# Patient Record
Sex: MALE | Race: BLACK OR AFRICAN AMERICAN | NOT HISPANIC OR LATINO | Employment: FULL TIME | URBAN - METROPOLITAN AREA
[De-identification: names, ages, dates, MRNs, and addresses within clinical notes are randomized per-mention and may not be internally consistent; named-entity substitution may affect disease eponyms.]

---

## 2020-10-05 ENCOUNTER — OFFICE VISIT (OUTPATIENT)
Dept: INTERNAL MEDICINE | Facility: CLINIC | Age: 40
End: 2020-10-05
Payer: COMMERCIAL

## 2020-10-05 VITALS
DIASTOLIC BLOOD PRESSURE: 88 MMHG | SYSTOLIC BLOOD PRESSURE: 146 MMHG | HEART RATE: 90 BPM | OXYGEN SATURATION: 97 % | WEIGHT: 315 LBS | BODY MASS INDEX: 40.43 KG/M2 | TEMPERATURE: 98 F | HEIGHT: 74 IN

## 2020-10-05 DIAGNOSIS — G47.33 OBSTRUCTIVE SLEEP APNEA SYNDROME: ICD-10-CM

## 2020-10-05 DIAGNOSIS — F17.210 CIGARETTE NICOTINE DEPENDENCE, UNCOMPLICATED: ICD-10-CM

## 2020-10-05 DIAGNOSIS — M25.561 BILATERAL CHRONIC KNEE PAIN: ICD-10-CM

## 2020-10-05 DIAGNOSIS — L83 ACANTHOSIS NIGRICANS: ICD-10-CM

## 2020-10-05 DIAGNOSIS — Z23 NEED FOR 23-POLYVALENT PNEUMOCOCCAL POLYSACCHARIDE VACCINE: ICD-10-CM

## 2020-10-05 DIAGNOSIS — Z23 NEED FOR INFLUENZA VACCINATION: ICD-10-CM

## 2020-10-05 DIAGNOSIS — Z11.4 SCREENING FOR HIV WITHOUT PRESENCE OF RISK FACTORS: ICD-10-CM

## 2020-10-05 DIAGNOSIS — G89.29 BILATERAL CHRONIC KNEE PAIN: ICD-10-CM

## 2020-10-05 DIAGNOSIS — E66.01 MORBID OBESITY WITH BODY MASS INDEX (BMI) GREATER THAN OR EQUAL TO 50: ICD-10-CM

## 2020-10-05 DIAGNOSIS — I10 ESSENTIAL HYPERTENSION: ICD-10-CM

## 2020-10-05 DIAGNOSIS — M25.562 BILATERAL CHRONIC KNEE PAIN: ICD-10-CM

## 2020-10-05 DIAGNOSIS — R53.83 FATIGUE, UNSPECIFIED TYPE: ICD-10-CM

## 2020-10-05 DIAGNOSIS — Z28.21 IMMUNIZATION NOT CARRIED OUT BECAUSE OF PATIENT REFUSAL: Chronic | ICD-10-CM

## 2020-10-05 DIAGNOSIS — I87.303 CHRONIC VENOUS HYPERTENSION INVOLVING BOTH SIDES: ICD-10-CM

## 2020-10-05 DIAGNOSIS — Z11.59 ENCOUNTER FOR HEPATITIS C SCREENING TEST FOR LOW RISK PATIENT: ICD-10-CM

## 2020-10-05 DIAGNOSIS — Z00.01 ENCOUNTER FOR WELL ADULT EXAM WITH ABNORMAL FINDINGS: Primary | ICD-10-CM

## 2020-10-05 DIAGNOSIS — G47.10 HYPERSOMNIA: ICD-10-CM

## 2020-10-05 DIAGNOSIS — Z86.39 PERSONAL HISTORY OF OTHER ENDOCRINE, NUTRITIONAL AND METABOLIC DISEASE: ICD-10-CM

## 2020-10-05 PROCEDURE — 99386 PREV VISIT NEW AGE 40-64: CPT | Mod: S$GLB,,, | Performed by: FAMILY MEDICINE

## 2020-10-05 PROCEDURE — 99386 PR PREVENTIVE VISIT,NEW,40-64: ICD-10-PCS | Mod: S$GLB,,, | Performed by: FAMILY MEDICINE

## 2020-10-05 PROCEDURE — 99999 PR PBB SHADOW E&M-NEW PATIENT-LVL IV: CPT | Mod: PBBFAC,,, | Performed by: FAMILY MEDICINE

## 2020-10-05 PROCEDURE — 99999 PR PBB SHADOW E&M-NEW PATIENT-LVL IV: ICD-10-PCS | Mod: PBBFAC,,, | Performed by: FAMILY MEDICINE

## 2020-10-05 PROCEDURE — 3008F BODY MASS INDEX DOCD: CPT | Mod: CPTII,S$GLB,, | Performed by: FAMILY MEDICINE

## 2020-10-05 PROCEDURE — 3008F PR BODY MASS INDEX (BMI) DOCUMENTED: ICD-10-PCS | Mod: CPTII,S$GLB,, | Performed by: FAMILY MEDICINE

## 2020-10-05 RX ORDER — CHLORTHALIDONE 25 MG/1
25 TABLET ORAL DAILY
Qty: 30 TABLET | Refills: 0 | Status: SHIPPED | OUTPATIENT
Start: 2020-10-05 | End: 2020-12-03

## 2020-10-05 NOTE — PATIENT INSTRUCTIONS
Someone from [facility] will be contacting you soon to help you get the home sleep test done. If you haven't heard from them within 2 weeks, please call the [facility] at [phone] and let them know that you were ordered to have a home sleep test done and that you haven't yet been contacted.       I've entered an order for you to be allowed to enroll in [facility]'s Hypertension Digital Medicine program. It allows you to use a good quality electronic blood pressure monitor to measure your blood pressure at home. Those numbers are then automatically sent by your smartphone to your hypertension care team for review. The program also includes excellent heart-health lifestyle coaching.    You'll receive regular feedback from your care team, including recommendations for any needed medication adjustments, exercise and healthy eating tips, heart-healthy coaching, and monthly progress reports.    Enrolling is easy and quick. Please take the time right now to stop by the [facility] in the first-floor lobby to get started. You can also call the [facility] Help Desk at [phone] for help getting set up and for any technical support.

## 2020-10-05 NOTE — ASSESSMENT & PLAN NOTE
STOP-BANG questionnaire to assess risk for obstructive sleep apnea (BECKA)  · Snoring: Do you snore loudly? ANSWER: YES  · Tired: Do you often feel tired, fatigued, or sleepy during daytime? ANSWER: YES [place] Sleepiness Scale Score = 8 (HIGHER NORMAL Daytime Sleepiness)  · Observed: Has anyone observed you stop breathing during your sleep? ANSWER: NO  · Pressure: Do you have or are you being treated for high blood pressure? ANSWER: YES  · BMI: BMI more than 35 kg/m2? ANSWER: YES (BMI = Body mass index is 51.12 kg/m².)   · Age: Age over 50 yr old? ANSWER: NO (Age = 40 y.o.)  · Neck circumference: Neck circumference greater than 40 cm? ANSWER: YES (Neck circumference = 49 cm, Mallampati class 4 oropharynx.  · Gender: Gender male? ANSWER: YES (Gender = male)    STOP-BANG Score = 6 (HIGH risk of BECKA)    ADDITIONAL RELEVANT HISTORY: He reports non-restorative sleep.  He reports involuntary weight gain.     INSTRUCTIONS PROVIDED: Do not drive or perform hazardous activities while drowsy.

## 2020-10-05 NOTE — ASSESSMENT & PLAN NOTE
He is currently at the pre-contemplation stage of smoking cessation (not thinking about quitting). Smoking cessation encouraged.

## 2020-10-06 ENCOUNTER — TELEPHONE (OUTPATIENT)
Dept: PULMONOLOGY | Facility: HOSPITAL | Age: 40
End: 2020-10-06

## 2020-10-07 ENCOUNTER — HOSPITAL ENCOUNTER (OUTPATIENT)
Dept: RADIOLOGY | Facility: HOSPITAL | Age: 40
Discharge: HOME | End: 2020-10-07
Attending: FAMILY MEDICINE
Payer: COMMERCIAL

## 2020-10-07 ENCOUNTER — HOSPITAL ENCOUNTER (OUTPATIENT)
Dept: CARDIOLOGY | Facility: HOSPITAL | Age: 40
Discharge: HOME | End: 2020-10-07
Attending: FAMILY MEDICINE
Payer: COMMERCIAL

## 2020-10-07 DIAGNOSIS — I10 ESSENTIAL HYPERTENSION: ICD-10-CM

## 2020-10-07 DIAGNOSIS — M25.561 BILATERAL CHRONIC KNEE PAIN: ICD-10-CM

## 2020-10-07 DIAGNOSIS — G89.29 BILATERAL CHRONIC KNEE PAIN: ICD-10-CM

## 2020-10-07 DIAGNOSIS — M25.562 BILATERAL CHRONIC KNEE PAIN: ICD-10-CM

## 2020-10-07 PROCEDURE — 73562 X-RAY EXAM OF KNEE 3: CPT | Mod: 26,50,, | Performed by: RADIOLOGY

## 2020-10-07 PROCEDURE — 73562 XR KNEE ORTHO BILAT: ICD-10-PCS | Mod: 26,50,, | Performed by: RADIOLOGY

## 2020-10-07 PROCEDURE — 73562 X-RAY EXAM OF KNEE 3: CPT | Mod: TC,50

## 2020-10-07 PROCEDURE — 93010 EKG 12-LEAD: ICD-10-PCS | Mod: ,,, | Performed by: INTERNAL MEDICINE

## 2020-10-07 PROCEDURE — 93010 ELECTROCARDIOGRAM REPORT: CPT | Mod: ,,, | Performed by: INTERNAL MEDICINE

## 2020-10-07 PROCEDURE — 93005 ELECTROCARDIOGRAM TRACING: CPT

## 2020-10-07 NOTE — PROGRESS NOTES
"Please contact him and relay my message:  Your x-rays showed "degenerative" or "wear-and-tear" type changes. The x-rays did NOT show any fracture or dislocation. We'll discuss all this further at your next appointment. I look forward to seeing you then."

## 2020-10-08 ENCOUNTER — TELEPHONE (OUTPATIENT)
Dept: INTERNAL MEDICINE | Facility: CLINIC | Age: 40
End: 2020-10-08

## 2020-10-12 ENCOUNTER — TELEPHONE (OUTPATIENT)
Dept: INTERNAL MEDICINE | Facility: CLINIC | Age: 40
End: 2020-10-12

## 2020-10-12 NOTE — TELEPHONE ENCOUNTER
Spoke with patient regarding lab results. Explained to patient that  would discuss with him at next appointment.Patient stated that he understand//LB

## 2020-10-16 NOTE — PROGRESS NOTES
WELLNESS VISIT (PREVENTIVE SERVICES)    CHIEF COMPLAINT  Annual Wellness Exam    HEALTH MAINTENANCE INTERVENTIONS - UP TO DATE  Health Maintenance Topics with due status: Not Due       Topic Last Completion Date    TETANUS VACCINE 01/01/2017    Lipid Panel 10/07/2020    Hemoglobin A1c 10/07/2020       HEALTH MAINTENANCE INTERVENTIONS - DUE OR DUE SOON  Health Maintenance Due   Topic Date Due    Foot Exam  08/19/1989    Eye Exam  08/19/1989    Urine Microalbumin  08/19/1989    Low Dose Statin  08/19/2000     This is my first time treating him here. All problems addressed today are NEW TO ME.  Josesito is requesting that I take over as his primary care provider.   He represents that his chronic conditions are stable, although perhaps not well controlled, and he is due/overdie for labs to evaluate and monitor his chronic health conditions. No other chronic problems or new complaints or concerns reported except as noted herein.    DIAGNOSES, HISTORY, ASSESSMENT, AND PLAN  Assessment   1. Encounter for well adult exam with abnormal findings    2. Screening for HIV without presence of risk factors    3. Encounter for hepatitis C screening test for low risk patient    4. Need for 23-polyvalent pneumococcal polysaccharide vaccine    5. Need for influenza vaccination    6. Immunizations not carried out because of patient refusal    7. Morbid obesity with body mass index (BMI) greater than or equal to 50    8. Essential hypertension    9. Cigarette nicotine dependence, uncomplicated    10. Bilateral chronic knee pain    11. Acanthosis nigricans    12. Obstructive sleep apnea syndrome    13. Fatigue, unspecified type    14. Hypersomnia    15. Chronic venous hypertension involving both sides    16. Personal history of other endocrine, nutritional and metabolic disease          Orders Placed This Encounter    CBC Without Differential    Lipid Panel    Hepatitis C Antibody    HIV 1/2 Ag/Ab (4th Gen)    Comprehensive  Metabolic Panel    Hemoglobin A1C    TSH    Ambulatory referral/consult to Smoking Cessation Program    NURSING COMMUNICATION: Create [facility]    Hypertension Digital Medicine (HDMP) Enrollment Order    SCHEDULED EKG 12-LEAD (to [place])    Home Sleep Studies    chlorthalidone (HYGROTEN) 25 MG Tab    Hypertension Digital Medicine (HDMP): Assign Onboarding Questionnaires       Plan   Problem List Items Addressed This Visit     Immunizations not carried out because of patient refusal (Chronic)    Overview     He refuses all immunizations.         Morbid obesity with body mass index (BMI) greater than or equal to 50    Relevant Orders    CBC Without Differential (Completed)    Lipid Panel (Completed)    Comprehensive Metabolic Panel (Completed)    Hemoglobin A1C (Completed)    TSH (Completed)    Essential hypertension    Relevant Medications    chlorthalidone (HYGROTEN) 25 MG Tab    Other Relevant Orders    Lipid Panel (Completed)    Comprehensive Metabolic Panel (Completed)    TSH (Completed)    SCHEDULED EKG 12-LEAD (to [place]) (Completed)    NURSING COMMUNICATION: Create [facility]    Hypertension Digital Medicine (HDMP) Enrollment Order (Completed)    Hypertension Digital Medicine (HDMP): Assign Onboarding Questionnaires (Completed)    Cigarette nicotine dependence, uncomplicated    Current Assessment & Plan     He is currently at the pre-contemplation stage of smoking cessation (not thinking about quitting). Smoking cessation encouraged.          Relevant Orders    Ambulatory referral/consult to Smoking Cessation Program    Bilateral chronic knee pain    Acanthosis nigricans    Relevant Orders    Comprehensive Metabolic Panel (Completed)    Hemoglobin A1C (Completed)    TSH (Completed)    Obstructive sleep apnea syndrome    Current Assessment & Plan     STOP-BANG questionnaire to assess risk for obstructive sleep apnea (BECKA)  · Snoring: Do you snore loudly? ANSWER: YES  · Tired: Do you often  feel tired, fatigued, or sleepy during daytime? ANSWER: YES [place] Sleepiness Scale Score = 8 (HIGHER NORMAL Daytime Sleepiness)  · Observed: Has anyone observed you stop breathing during your sleep? ANSWER: NO  · Pressure: Do you have or are you being treated for high blood pressure? ANSWER: YES  · BMI: BMI more than 35 kg/m2? ANSWER: YES (BMI = Body mass index is 51.12 kg/m².)   · Age: Age over 50 yr old? ANSWER: NO (Age = 40 y.o.)  · Neck circumference: Neck circumference greater than 40 cm? ANSWER: YES (Neck circumference = 49 cm, Mallampati class 4 oropharynx.  · Gender: Gender male? ANSWER: YES (Gender = male)    STOP-BANG Score = 6 (HIGH risk of BECKA)    ADDITIONAL RELEVANT HISTORY: He reports non-restorative sleep.  He reports involuntary weight gain.     INSTRUCTIONS PROVIDED: Do not drive or perform hazardous activities while drowsy.          Relevant Orders    Home Sleep Studies    Chronic venous hypertension involving both sides    Relevant Orders    Comprehensive Metabolic Panel (Completed)      Other Visit Diagnoses     Encounter for well adult exam with abnormal findings    -  Primary    Screening for HIV without presence of risk factors        Relevant Orders    HIV 1/2 Ag/Ab (4th Gen) (Completed)    Encounter for hepatitis C screening test for low risk patient        Relevant Orders    Hepatitis C Antibody (Completed)    Need for 23-polyvalent pneumococcal polysaccharide vaccine        Need for influenza vaccination        Fatigue, unspecified type        Relevant Orders    CBC Without Differential (Completed)    Comprehensive Metabolic Panel (Completed)    TSH (Completed)    Home Sleep Studies    Hypersomnia        Relevant Orders    Home Sleep Studies    Personal history of other endocrine, nutritional and metabolic disease         Relevant Orders    Hemoglobin A1C (Completed)          No outpatient medications prior to visit.     No facility-administered medications prior to visit.         There  "are no discontinued medications.     Medications Ordered This Encounter   Medications    chlorthalidone (HYGROTEN) 25 MG Tab     Sig: Take 1 tablet (25 mg total) by mouth once daily.     Dispense:  30 tablet     Refill:  0     - LABS AND APPOINTMENT REQUIRED FOR MORE REFILLS       Follow up in about 2 weeks (around 10/19/2020).     Subjective   Review of Systems   Constitutional: Negative for chills and fever.   HENT: Negative for ear pain and trouble swallowing.    Eyes: Negative for pain and visual disturbance.   Respiratory: Negative for chest tightness and shortness of breath.    Cardiovascular: Negative for chest pain and palpitations.   Gastrointestinal: Negative for abdominal pain and blood in stool.   Endocrine: Negative for polydipsia and polyuria.   Genitourinary: Negative.  Negative for difficulty urinating, dysuria and hematuria.   Musculoskeletal: Negative for gait problem and joint swelling.   Integumentary:  Negative for rash and wound.   Neurological: Negative for vertigo and syncope.   Hematological: Negative.    Psychiatric/Behavioral: Negative for agitation and confusion.        Objective   Vitals:    10/05/20 1452   BP: (!) 146/88   BP Location: Left arm   Patient Position: Sitting   BP Method: Large (Manual)   Pulse: 90   Temp: 97.8 °F (36.6 °C)   TempSrc: Tympanic   SpO2: 97%   Weight: (!) 180.6 kg (398 lb 2.4 oz)   Height: 6' 2" (1.88 m)     Physical Exam  Vitals signs reviewed.   Constitutional:       General: He is not in acute distress.     Appearance: Normal appearance.   Eyes:      General: No scleral icterus.     Conjunctiva/sclera: Conjunctivae normal.   Neck:      Musculoskeletal: No muscular tenderness.      Vascular: No carotid bruit.   Cardiovascular:      Rate and Rhythm: Normal rate and regular rhythm.      Heart sounds: Normal heart sounds.   Pulmonary:      Effort: Pulmonary effort is normal. No respiratory distress.      Breath sounds: Normal breath sounds. No wheezing or " "rhonchi.   Abdominal:      General: Bowel sounds are normal. There is no distension.      Palpations: Abdomen is soft. There is no mass.      Tenderness: There is no abdominal tenderness.   Lymphadenopathy:      Cervical: No cervical adenopathy.   Skin:     General: Skin is warm and dry.      Coloration: Skin is not jaundiced.   Neurological:      General: No focal deficit present.      Mental Status: He is alert and oriented to person, place, and time.   Psychiatric:         Mood and Affect: Mood normal.         Behavior: Behavior normal.         Judgment: Judgment normal.              PAST MEDICAL HISTORY  He has a past medical history of Essential hypertension.    SURGICAL HISTORY  He has a past surgical history that includes Knee surgery (Left, 1996/1997).    FAMILY HISTORY  His family history includes No Known Problems in his father and mother.     ALLERGIES  Review of patient's allergies indicates:  No Known Allergies    SOCIAL HISTORY   TOBACCO USE HISTORY: He reports that he has been smoking cigarettes. He has been smoking about 0.50 packs per day. He has never used smokeless tobacco.   ALCOHOL USE HISTORY:  He reports current alcohol use.   RECREATIONAL DRUG USE HISTORY:  He reports no history of drug use.    Documentation entered by me for this encounter may have been done in part using speech-recognition technology. Although I have made an effort to ensure accuracy, "sound like" errors may exist and should be interpreted in context. -ARUN Monson MD.     "

## 2020-10-19 ENCOUNTER — OFFICE VISIT (OUTPATIENT)
Dept: INTERNAL MEDICINE | Facility: CLINIC | Age: 40
End: 2020-10-19
Payer: COMMERCIAL

## 2020-10-19 ENCOUNTER — PATIENT MESSAGE (OUTPATIENT)
Dept: ADMINISTRATIVE | Facility: OTHER | Age: 40
End: 2020-10-19

## 2020-10-19 ENCOUNTER — LAB VISIT (OUTPATIENT)
Dept: LAB | Facility: HOSPITAL | Age: 40
End: 2020-10-19
Attending: FAMILY MEDICINE
Payer: COMMERCIAL

## 2020-10-19 VITALS
OXYGEN SATURATION: 95 % | DIASTOLIC BLOOD PRESSURE: 60 MMHG | WEIGHT: 315 LBS | HEIGHT: 74 IN | HEART RATE: 84 BPM | TEMPERATURE: 99 F | SYSTOLIC BLOOD PRESSURE: 120 MMHG | BODY MASS INDEX: 40.43 KG/M2

## 2020-10-19 DIAGNOSIS — E11.69 TYPE 2 DIABETES MELLITUS WITH OTHER SPECIFIED COMPLICATION, WITHOUT LONG-TERM CURRENT USE OF INSULIN: Primary | Chronic | ICD-10-CM

## 2020-10-19 DIAGNOSIS — E11.69 TYPE 2 DIABETES MELLITUS WITH OTHER SPECIFIED COMPLICATION, WITHOUT LONG-TERM CURRENT USE OF INSULIN: Chronic | ICD-10-CM

## 2020-10-19 DIAGNOSIS — G47.33 OBSTRUCTIVE SLEEP APNEA SYNDROME: ICD-10-CM

## 2020-10-19 DIAGNOSIS — I10 ESSENTIAL HYPERTENSION: ICD-10-CM

## 2020-10-19 DIAGNOSIS — E78.5 DYSLIPIDEMIA ASSOCIATED WITH TYPE 2 DIABETES MELLITUS: ICD-10-CM

## 2020-10-19 DIAGNOSIS — E11.69 DYSLIPIDEMIA ASSOCIATED WITH TYPE 2 DIABETES MELLITUS: ICD-10-CM

## 2020-10-19 DIAGNOSIS — E66.01 MORBID OBESITY WITH BODY MASS INDEX (BMI) GREATER THAN OR EQUAL TO 50: ICD-10-CM

## 2020-10-19 PROCEDURE — 3008F BODY MASS INDEX DOCD: CPT | Mod: CPTII,S$GLB,, | Performed by: FAMILY MEDICINE

## 2020-10-19 PROCEDURE — 99214 PR OFFICE/OUTPT VISIT, EST, LEVL IV, 30-39 MIN: ICD-10-PCS | Mod: S$GLB,,, | Performed by: FAMILY MEDICINE

## 2020-10-19 PROCEDURE — 82043 UR ALBUMIN QUANTITATIVE: CPT

## 2020-10-19 PROCEDURE — 99999 PR PBB SHADOW E&M-EST. PATIENT-LVL V: CPT | Mod: PBBFAC,,, | Performed by: FAMILY MEDICINE

## 2020-10-19 PROCEDURE — 3052F PR MOST RECENT HEMOGLOBIN A1C LEVEL 8.0 - < 9.0%: ICD-10-PCS | Mod: CPTII,S$GLB,, | Performed by: FAMILY MEDICINE

## 2020-10-19 PROCEDURE — 3008F PR BODY MASS INDEX (BMI) DOCUMENTED: ICD-10-PCS | Mod: CPTII,S$GLB,, | Performed by: FAMILY MEDICINE

## 2020-10-19 PROCEDURE — 3052F HG A1C>EQUAL 8.0%<EQUAL 9.0%: CPT | Mod: CPTII,S$GLB,, | Performed by: FAMILY MEDICINE

## 2020-10-19 PROCEDURE — 99999 PR PBB SHADOW E&M-EST. PATIENT-LVL V: ICD-10-PCS | Mod: PBBFAC,,, | Performed by: FAMILY MEDICINE

## 2020-10-19 PROCEDURE — 99214 OFFICE O/P EST MOD 30 MIN: CPT | Mod: S$GLB,,, | Performed by: FAMILY MEDICINE

## 2020-10-19 RX ORDER — ATORVASTATIN CALCIUM 20 MG/1
20 TABLET, FILM COATED ORAL DAILY
Qty: 90 TABLET | Refills: 3 | Status: SHIPPED | OUTPATIENT
Start: 2020-10-19 | End: 2020-10-19 | Stop reason: SDUPTHER

## 2020-10-19 RX ORDER — METFORMIN HYDROCHLORIDE 500 MG/1
TABLET, EXTENDED RELEASE ORAL
Qty: 180 TABLET | Refills: 0 | Status: SHIPPED | OUTPATIENT
Start: 2020-10-19 | End: 2021-06-08 | Stop reason: SDUPTHER

## 2020-10-19 RX ORDER — ATORVASTATIN CALCIUM 20 MG/1
20 TABLET, FILM COATED ORAL NIGHTLY
Qty: 90 TABLET | Refills: 4 | Status: SHIPPED | OUTPATIENT
Start: 2020-10-19 | End: 2021-06-08 | Stop reason: SDUPTHER

## 2020-10-19 NOTE — PATIENT INSTRUCTIONS
Learn more about healthy lifestyle choices at the website of the American Diabetes Association, www.diabetes.org.       Call the [facility] at [phone] and let them know that you were ordered to have a home sleep test done and that you haven't yet been contacted.      I've entered a referral order for you to be able to participate in [facility]'s excellent Comprehensive Weight Loss Program. Call [phone] to schedule your appointment. You can learn more about the program at [email or web address]      I've entered an order for you to be enrolled in [facility]'s Hypertension and Diabetes Digital Medicine programs. The programs allow you to use good-quality electronic meters to measure your blood pressure and blood glucose at home. Those numbers are then automatically sent by your smartphone to your hypertension and diabetes care teams for review.    You'll receive regular feedback from your care teams, including recommendations for any needed medication adjustments, exercise and healthy eating tips, heart-healthy coaching, and monthly progress reports.    Enrolling is easy and quick. Please take the time right now to stop by the [facility] in the first-floor lobby to get started. You can also call the [facility] Help Desk at [phone] for help getting set up and for any technical support.

## 2020-10-19 NOTE — PROGRESS NOTES
CHIEF COMPLAINT  Follow-up and Results      DIAGNOSES, HISTORY, ASSESSMENT, AND PLAN  Assessment   1. Type 2 diabetes mellitus with other specified complication, without long-term current use of insulin    2. Essential hypertension    3. Dyslipidemia associated with type 2 diabetes mellitus    4. Obstructive sleep apnea syndrome    5. Morbid obesity with body mass index (BMI) greater than or equal to 50       New diagnosis: Type 2 diabetes.    Orders Placed This Encounter    Microalbumin/Creatinine Ratio, Urine    Ambulatory referral/consult to Diabetes Education    Ambulatory referral/consult to Optometry    Ambulatory referral/consult to Weight Management Program    Ambulatory referral/consult to Bariatric Surgery    Hypertension Digital Medicine (HDMP) Enrollment Order    Diabetes Digital Medicine (DDMP) Enrollment Order    metFORMIN (GLUCOPHAGE-XR) 500 MG ER 24hr tablet    atorvastatin (LIPITOR) 20 MG tablet    Diabetes Digital Medicine (DDMP): Assign Onboarding Questionnaires       Except as noted herein, any chronic conditions are compensated/controlled and stable, and no other significant new complaints or concerns reported.     Plan   Problem List Items Addressed This Visit     Type 2 diabetes mellitus with other specified complication - Primary (Chronic)    Current Assessment & Plan     Diabetes Management Status    Statin: Taking  ACE/ARB: Not taking    Screening or Prevention Patient's value Goal Complete/Controlled?   HgA1C Testing and Control   Lab Results   Component Value Date    HGBA1C 9.0 (H) 10/07/2020      Annually/Less than 8% No   Lipid profile : 10/07/2020 Annually Yes   LDL control Lab Results   Component Value Date    LDLCALC 104.0 10/07/2020    Annually/Less than 100 mg/dl  No   Nephropathy screening No results found for: LABMICR  No results found for: PROTEINUA Annually No   Blood pressure BP Readings from Last 1 Encounters:   10/19/20 120/60    Less than 140/90 Yes   Dilated  retinal exam Most Recent Eye Exam Date: Not Found Annually No   Foot exam   : 10/19/2020 Annually Yes     Lab Results   Component Value Date    HGBA1C 9.0 (H) 10/07/2020    ESTGFRAFRICA >60.0 10/07/2020    EGFRNONAA >60.0 10/07/2020    LDLCALC 104.0 10/07/2020     Last 6 Patient Entered Readings                                          Most Recent A1c:      There is no flowsheet data to display.         New diagnosis. Asymptomatic, uncontrolled. We discussed risks and benefits of treatment options. Therapeutic lifestyle changes encouraged.    HEALTH MAINTENANCE: Diabetic health maintenance interventions reviewed and are up to date except for:      Topic    Eye Exam     Urine Protein Check      DIABETIC FOOT EXAM: Inspection of feet reveals no open wounds, ulcerations, significant calluses, or evidence of arterial insufficiency. 10g monofilament sensation is intact in all 5 locations tested.          Relevant Medications    metFORMIN (GLUCOPHAGE-XR) 500 MG ER 24hr tablet    Other Relevant Orders    Microalbumin/Creatinine Ratio, Urine    Ambulatory referral/consult to Diabetes Education    Ambulatory referral/consult to Optometry    Diabetes Digital Medicine (DDMP) Enrollment Order (Completed)    Diabetes Digital Medicine (DDMP): Assign Onboarding Questionnaires (Completed)    Ambulatory referral/consult to Bariatric Surgery    Morbid obesity with body mass index (BMI) greater than or equal to 50    Relevant Orders    Ambulatory referral/consult to Weight Management Program    Ambulatory referral/consult to Bariatric Surgery    Essential hypertension    Relevant Orders    Hypertension Digital Medicine (HDMP) Enrollment Order (Completed)    Obstructive sleep apnea syndrome    Relevant Orders    Ambulatory referral/consult to Bariatric Surgery    Dyslipidemia associated with type 2 diabetes mellitus    Current Assessment & Plan     Lab Results   Component Value Date    CHOL 165 10/07/2020    TRIG 85 10/07/2020    HDL  44 10/07/2020    LDLCALC 104.0 10/07/2020    NONHDLCHOL 121 10/07/2020    AST 31 10/07/2020    ALT 67 (H) 10/07/2020     The 10-year ASCVD risk score (Gt NGUYEN Jr., et al., 2013) is: 14.9%    Values used to calculate the score:      Age: 40 years      Sex: Male      Is Non- : Yes      Diabetic: Yes      Tobacco smoker: Yes      Systolic Blood Pressure: 120 mmHg      Is BP treated: Yes      HDL Cholesterol: 44 mg/dL      Total Cholesterol: 165 mg/dL  We discussed risks and benefits of treatment options. No contraindication to statin.          Relevant Medications    metFORMIN (GLUCOPHAGE-XR) 500 MG ER 24hr tablet    atorvastatin (LIPITOR) 20 MG tablet          Outpatient Medications Prior to Visit   Medication Sig Dispense Refill    chlorthalidone (HYGROTEN) 25 MG Tab Take 1 tablet (25 mg total) by mouth once daily. 30 tablet 0     No facility-administered medications prior to visit.         Medications Discontinued During This Encounter   Medication Reason    atorvastatin (LIPITOR) 20 MG tablet Reorder        Medications Ordered This Encounter   Medications    atorvastatin (LIPITOR) 20 MG tablet     Sig: Take 1 tablet (20 mg total) by mouth every evening. (FOR HEART HEALTH)     Dispense:  90 tablet     Refill:  4    metFORMIN (GLUCOPHAGE-XR) 500 MG ER 24hr tablet     Sig: Take 1 tablet (500 mg total) by mouth daily with breakfast for 7 days, THEN 1 tablet (500 mg total) 2 (two) times daily with meals.     Dispense:  180 tablet     Refill:  0       Subjective   Review of Systems   Constitutional: Negative for chills and fever.   Respiratory: Negative for chest tightness and shortness of breath.    Endocrine: Negative for polydipsia and polyuria.        Objective   Vitals:    10/19/20 1123   BP: 120/60   BP Location: Right arm   Patient Position: Sitting   BP Method: Large (Manual)   Pulse: 84   Temp: 98.8 °F (37.1 °C)   TempSrc: Tympanic   SpO2: 95%   Weight: (!) 177.8 kg (391 lb 15.7 oz)  "  Height: 6' 2" (1.88 m)     Physical Exam  Vitals signs reviewed.   Constitutional:       General: He is not in acute distress.     Appearance: Normal appearance. He is not ill-appearing or diaphoretic.   Eyes:      General: No scleral icterus.  Cardiovascular:      Rate and Rhythm: Normal rate and regular rhythm.   Pulmonary:      Effort: Pulmonary effort is normal.      Breath sounds: Normal breath sounds.   Skin:     General: Skin is warm and dry.   Neurological:      General: No focal deficit present.      Mental Status: He is alert and oriented to person, place, and time.   Psychiatric:         Mood and Affect: Mood normal.         Behavior: Behavior normal.         Judgment: Judgment normal.           Documentation entered by me for this encounter may have been done in part using speech-recognition technology. Although I have made an effort to ensure accuracy, "sound like" errors may exist and should be interpreted in context. -ARUN Monson MD.    "

## 2020-10-19 NOTE — ASSESSMENT & PLAN NOTE
Diabetes Management Status    Statin: Taking  ACE/ARB: Not taking    Screening or Prevention Patient's value Goal Complete/Controlled?   HgA1C Testing and Control   Lab Results   Component Value Date    HGBA1C 9.0 (H) 10/07/2020      Annually/Less than 8% No   Lipid profile : 10/07/2020 Annually Yes   LDL control Lab Results   Component Value Date    LDLCALC 104.0 10/07/2020    Annually/Less than 100 mg/dl  No   Nephropathy screening No results found for: LABMICR  No results found for: PROTEINUA Annually No   Blood pressure BP Readings from Last 1 Encounters:   10/19/20 120/60    Less than 140/90 Yes   Dilated retinal exam Most Recent Eye Exam Date: Not Found Annually No   Foot exam   : 10/19/2020 Annually Yes     Lab Results   Component Value Date    HGBA1C 9.0 (H) 10/07/2020    ESTGFRAFRICA >60.0 10/07/2020    EGFRNONAA >60.0 10/07/2020    LDLCALC 104.0 10/07/2020     Last 6 Patient Entered Readings                                          Most Recent A1c:      There is no flowsheet data to display.         New diagnosis. Asymptomatic, uncontrolled. We discussed risks and benefits of treatment options. Therapeutic lifestyle changes encouraged.    HEALTH MAINTENANCE: Diabetic health maintenance interventions reviewed and are up to date except for:      Topic    Eye Exam     Urine Protein Check      DIABETIC FOOT EXAM: Inspection of feet reveals no open wounds, ulcerations, significant calluses, or evidence of arterial insufficiency. 10g monofilament sensation is intact in all 5 locations tested.   
Lab Results   Component Value Date    CHOL 165 10/07/2020    TRIG 85 10/07/2020    HDL 44 10/07/2020    LDLCALC 104.0 10/07/2020    NONHDLCHOL 121 10/07/2020    AST 31 10/07/2020    ALT 67 (H) 10/07/2020     The 10-year ASCVD risk score (Gt NGUYEN Jr., et al., 2013) is: 14.9%    Values used to calculate the score:      Age: 40 years      Sex: Male      Is Non- : Yes      Diabetic: Yes      Tobacco smoker: Yes      Systolic Blood Pressure: 120 mmHg      Is BP treated: Yes      HDL Cholesterol: 44 mg/dL      Total Cholesterol: 165 mg/dL  We discussed risks and benefits of treatment options. No contraindication to statin.   
No

## 2020-10-20 ENCOUNTER — PATIENT OUTREACH (OUTPATIENT)
Dept: OTHER | Facility: OTHER | Age: 40
End: 2020-10-20

## 2020-10-20 DIAGNOSIS — E11.9 TYPE 2 DIABETES MELLITUS: ICD-10-CM

## 2020-10-20 LAB
ALBUMIN/CREAT UR: 11.2 UG/MG (ref 0–30)
CREAT UR-MCNC: 125 MG/DL (ref 23–375)
MICROALBUMIN UR DL<=1MG/L-MCNC: 14 UG/ML

## 2020-10-20 NOTE — PROGRESS NOTES
"Josesito Hamilton.    I'm happy to report that these results are fine and do not require a change in treatment.    Thanks for letting me care for you, thanks for trusting us with your healthcare needs, and thanks for using [facility].    Sincerely,    ARUN Monson MD  --------------------------------------------------------------------------------   Want to learn more about your test results and what they mean?  (1) Log in to your [facility] account at [email or web address].  (2) From the "View test results" tab, click on the test you want to know more about.  (3) Click on the "About This Test" link."

## 2020-10-20 NOTE — LETTER
November 18, 2020     Josesito Crabtree  [address] [facility] [address]       Dear Josesito,    Welcome to [facility] Our goal is to make care effective, proactive and convenient by using data you send us from home to better treat your chronic conditions.                My name is Eleanor Sandoval, and I am your dedicated Digital Medicine clinician. As an expert in medication management, I will help ensure that the medications you are taking continue to provide the intended benefits and help you reach your goals. You can reach me directly at [phone] or by sending me a message directly through your [facility] account.      I am Amelia York and I will be your health . My job is to help you identify lifestyle changes to improve your disease control. We will talk about nutrition, exercise, and other ways you may be able to adjust your current habits to better your health. Additionally, we will help ensure you are completing the tests and screenings that are necessary to help manage your conditions. You can reach me directly at [phone] or by sending me a message directly through your [facility] account.    Most importantly, YOU are at the center of this team. Together, we will work to improve your overall health and encourage you to meet your goals for a healthier lifestyle.     What we expect from YOU:  · Please take frequent home blood pressure measurements. We ask that you take at least 1 blood pressure reading per week, but more information will better help us get you know you. Be sure you rest for a few minutes before taking the reading in a quiet, comfortable place.    · Please take frequent home blood sugar measurements according to the frequency your physician and Digital Medicine care team specify. It is important that your team see both fasting and after meal readings.      Be available to receive phone calls or [employer] messages, when appropriate, from your care  team. Please let us know if there are any specific days or times that work best for us to reach you via phone.     Complete routine tests and screenings. Dont worry, we will help keep you on track!           What you should expect from your Digital Medicine Care Team:   We will work with you to create a personalized plan of care and provide you with encouragement and education, including regarding lifestyle changes, that could help you manage your disease states.     We will adjust your current medications, if needed, and continue to monitor your long-term progress.     We will provide you and your physician with monthly progress reports after you have been in the program for more than 30 days.     We will send you reminders through [employer] and text messages to help ensure you do not miss any testing deadlines to help manage your disease states.    You will be able to reach us by phone or through your [email or web address] account by clicking our names under Care Team on the right side of the home screen.    We look forward to working with you to help manage your health,    Sincerely,    Your Digital Medicine Team    Please visit our websites to learn more:   · Hypertension: [email or web address]  · Diabetes: [email or web address]      Remember, we are not available for emergencies. If you have an emergency, please contact your doctors office directly or call [facility] ([phone] or [phone]) or 911.    Diabetes: We want help you get important tests and screenings done regularly to assure that your health needs are met. We have put a new system in place, called CareTouch that will help us improve how we monitor and reach out to you about the following lab tests that you will need to help manage your diabetes.  · Hemoglobin A1c testing (Frequency: Every 3 to 6 months, dependent on A1c goal)  · Nephropathy Assessment, generally urine micro albumin testing (Frequency:  Yearly)  · Eye exam through a quick 30-minute Eye Photo Exam (Frequency: 1-2 Years, depending on result)    When necessary you can come in to one of the lab locations between 10:30 am and 4:00 pm to have your tests done prior to their due date. Tell the  you received a CareTouch letter, or just look for the CareTouch sign.    For CareTouch lab locations, click here.

## 2020-10-23 ENCOUNTER — PATIENT MESSAGE (OUTPATIENT)
Dept: SLEEP MEDICINE | Facility: HOSPITAL | Age: 40
End: 2020-10-23

## 2020-11-10 ENCOUNTER — PROCEDURE VISIT (OUTPATIENT)
Dept: SLEEP MEDICINE | Facility: CLINIC | Age: 40
End: 2020-11-10
Payer: COMMERCIAL

## 2020-11-10 DIAGNOSIS — G47.33 OBSTRUCTIVE SLEEP APNEA SYNDROME: Primary | ICD-10-CM

## 2020-11-10 DIAGNOSIS — R53.83 FATIGUE, UNSPECIFIED TYPE: ICD-10-CM

## 2020-11-10 DIAGNOSIS — G47.10 HYPERSOMNIA: ICD-10-CM

## 2020-11-10 PROCEDURE — 95800 SLP STDY UNATTENDED: CPT

## 2020-11-10 PROCEDURE — 95800 SLP STDY UNATTENDED: CPT | Mod: 26,,, | Performed by: INTERNAL MEDICINE

## 2020-11-10 PROCEDURE — 95800 PR SLEEP STUDY, UNATTENDED, RECORD HEART RATE/O2 SAT/RESP ANAL/SLEEP TIME: ICD-10-PCS | Mod: 26,,, | Performed by: INTERNAL MEDICINE

## 2020-11-10 NOTE — Clinical Note
PHYSICIAN INTERPRETATION AND COMMENTS: Findings are consistent with moderate, non-positional obstructive sleep  apnea (BECKA).Overall AHI was 21.0/hr, Loud snoring. Please refer to sleep disorder clinic for evaluation and management. CPAP  titration or AutoPAP 5-20 cmwp with mask of choice.  CLINICAL HISTORY: 40 year old male presented with: 18.5 inch neck, BMI of 49, an [place] sleepiness score of 4, history of  hypertension, diabetes, a previous diagnosis of BECKA, daily use of supplemental oxygen and symptoms of nocturnal snoring. Based on  the clinical history, the patient has a high pre-test probability of having severe BECKA. STOPBANG score 6.

## 2020-11-11 NOTE — PROCEDURES
Home Sleep Studies    Date/Time: 11/10/2020 8:00 AM  Performed by: Estrada Potts MD  Authorized by: ARUN Monson MD       PHYSICIAN INTERPRETATION AND COMMENTS: Findings are consistent with moderate, non-positional obstructive sleep  apnea (BECKA).Overall AHI was 21.0/hr, Loud snoring. Please refer to sleep disorder clinic for evaluation and management. CPAP  titration or AutoPAP 5-20 cmwp with mask of choice.  CLINICAL HISTORY: 40 year old male presented with: 18.5 inch neck, BMI of 49, an [place] sleepiness score of 4, history of  hypertension, diabetes, a previous diagnosis of BECKA, daily use of supplemental oxygen and symptoms of nocturnal snoring. Based on  the clinical history, the patient has a high pre-test probability of having severe BECKA. STOPBANG score 6.

## 2020-11-12 ENCOUNTER — PATIENT OUTREACH (OUTPATIENT)
Dept: ADMINISTRATIVE | Facility: HOSPITAL | Age: 40
End: 2020-11-12

## 2020-11-12 NOTE — PROGRESS NOTES
Working A1c Panel report:     Last A1c was 10/2020 and was > 8. Pt will be due again in 01/2021. Called to pt discuss scheduling 1 month f/u. No answer, sent [employer] message.

## 2020-11-18 ENCOUNTER — PATIENT MESSAGE (OUTPATIENT)
Dept: INTERNAL MEDICINE | Facility: CLINIC | Age: 40
End: 2020-11-18

## 2020-11-18 ENCOUNTER — PATIENT OUTREACH (OUTPATIENT)
Dept: OTHER | Facility: OTHER | Age: 40
End: 2020-11-18

## 2020-11-18 DIAGNOSIS — E11.69 TYPE 2 DIABETES MELLITUS WITH OTHER SPECIFIED COMPLICATION, WITHOUT LONG-TERM CURRENT USE OF INSULIN: Primary | Chronic | ICD-10-CM

## 2020-11-18 DIAGNOSIS — G47.33 OBSTRUCTIVE SLEEP APNEA SYNDROME: Primary | ICD-10-CM

## 2020-11-18 NOTE — PROGRESS NOTES
Digital Medicine: Health  Introduction    Introduced Josesito Crabtree to Digital Medicine. Discussed health  role and recommended lifestyle modifications.    The history is provided by the patient.               HYPERTENSION  Explained hypertension digital medicine goals including BP goal less than or equal to 130/80mmHg, improved convenience of BP management and reduced risk of heart attack, kidney failure, stroke, eye disease, dementia, and death.      Explained non-pharmacologic therapies like low salt diet and physical activity can reduce blood pressure. .      Explained that we expect patient to submit several blood pressure readings per week at random times of the day, but at least 30 minutes after taking blood pressure medications. Instructed patient not to allow anyone else to use their blood pressure monitor and phone as data submitted is directly entered into medical record. Reviewed and confirmed appropriate blood pressure monitoring technique.         Patient's BP goal is less than or equal to 130/80.Patient's BP average is 149/91 mmHg, which is above goal, per 2017 ACC/AHA Hypertension Guidelines.        DIABETES  Explained the goal of the diabetes digital medicine program is to decrease A1c within patient-specific target levels. Reviewed benefits of A1c reduction including reducing risk for kidney, eye, and nerve disease.      Explained that we expect patient to submit blood sugar readings as prescribed. Instructed patient not to allow anyone else to use their glucometer and phone as data submitted is directly entered into their medical record. Reviewed and confirmed appropriate blood sugar testing technique.       Reviewed signs and symptoms of hypoglycemia (weakness, dizziness, hunger, shakiness, nausea, headache, heart palpitations, sweating, fatigue, anxiety, etc.).  Reviewed treatment of hypoglycemia (15/15 rule).      Patient's A1C goal is less than or equal to 7.  Patient's most recent  A1C result is above goal.  @RESUFAST(LABA1C,HGBA).                Diet-Assessed        Intervention(s): portion control, carb reduction, reducing processed foods and DASH diet/sodium reduction education  Additional diet details: Encouraged limiting sodium intake and discussed foods high in sodium. Encouraged the use of salt free seasonings and gave examples. Suggested 45-60g CHO for meals and 15 g for snacks.       Physical Activity-Assessed      Intervention(s): created new goal         Additional physical activity details: Encouraged 150 minutes of physical activity weekly (30 minutes five days a week).       Medication Adherence-Medication Adherence not addressed.      Substance, Sleep, Stress-Not assessed      Continue current diet/physical activity routine.  Provided patient education.  Reviewed Device Techniques.     Addressed patient questions and patient has my contact information if needed prior to next outreach. Patient verbalizes understanding.      Explained the importance of self-monitoring and medication adherence. Encouraged the patient to communicate with their health  for lifestyle modifications to help improve or maintain a healthy lifestyle.                   Topic    Eye Exam          Last 5 Patient Entered Readings                                      Current 30 Day Average: 149/91     Recent Readings 10/22/2020 10/21/2020 10/20/2020 10/19/2020    SBP (mmHg) 154 156 152 133    DBP (mmHg) 100 91 95 77    Pulse 97 91 88 91        Last 6 Patient Entered Readings                                          Most Recent A1c:      Recent Readings 10/22/2020 10/21/2020 10/20/2020    Blood Glucose (mg/dL) 312 090 270

## 2020-11-19 NOTE — PROGRESS NOTES
DIAGNOSES  1. Obstructive sleep apnea    ORDERS  -- Ambulatory referral/consult to Sleep Disorders

## 2020-11-19 NOTE — TELEPHONE ENCOUNTER
"Hi, Josesito.    I'm glad you had your sleep test done.    Your sleep test shows that you have MODERATE sleep apnea, with an AHI ("Apnea-Hypopnea Index") of 21 events per hour. This means that you stop breathing an average of 21 times an hour. This disrupts your sleep cycle and can cause you to feel tired.    I've entered an order for you to see one of our excellent sleep specialists to discuss your treatment options and decide if you would benefit from treatment and which treatment will be best for you.    Someone from the [facility] will be contacting you soon to help you schedule an appointment. If you haven't been contacted about this within the next week, call our office, [phone], and tell them that you were referred to the sleep clinic by me and you need help scheduling your appointment.    Thanks for letting me care for you, and thanks for trusting [facility] with your healthcare needs.    Sincerely,    ARUN Monson MD     DIAGNOSES  1. Obstructive sleep apnea    ORDERS  -- Ambulatory referral/consult to Sleep Disorders  "

## 2020-12-03 ENCOUNTER — PATIENT OUTREACH (OUTPATIENT)
Dept: OTHER | Facility: OTHER | Age: 40
End: 2020-12-03

## 2020-12-21 ENCOUNTER — PATIENT OUTREACH (OUTPATIENT)
Dept: ADMINISTRATIVE | Facility: OTHER | Age: 40
End: 2020-12-21

## 2020-12-21 NOTE — PROGRESS NOTES
Health Maintenance Due   Topic Date Due    Eye Exam  08/19/1989     Updates were requested from care everywhere.  Chart was reviewed for overdue Proactive [facility] Encounters (SONIYA) topics (CRS, Breast Cancer Screening, Eye exam)  Health Maintenance has been updated.

## 2020-12-22 ENCOUNTER — OFFICE VISIT (OUTPATIENT)
Dept: SLEEP MEDICINE | Facility: CLINIC | Age: 40
End: 2020-12-22
Payer: COMMERCIAL

## 2020-12-22 VITALS
BODY MASS INDEX: 40.43 KG/M2 | DIASTOLIC BLOOD PRESSURE: 86 MMHG | WEIGHT: 315 LBS | HEIGHT: 74 IN | SYSTOLIC BLOOD PRESSURE: 142 MMHG | OXYGEN SATURATION: 97 % | HEART RATE: 104 BPM | RESPIRATION RATE: 19 BRPM

## 2020-12-22 DIAGNOSIS — E66.01 MORBID OBESITY WITH BMI OF 45.0-49.9, ADULT: Primary | ICD-10-CM

## 2020-12-22 DIAGNOSIS — G47.26 SHIFT WORK SLEEP DISORDER: ICD-10-CM

## 2020-12-22 DIAGNOSIS — I10 HYPERTENSION, UNSPECIFIED TYPE: ICD-10-CM

## 2020-12-22 DIAGNOSIS — G47.33 OBSTRUCTIVE SLEEP APNEA SYNDROME: ICD-10-CM

## 2020-12-22 LAB
LEFT EYE DM RETINOPATHY: NEGATIVE
RIGHT EYE DM RETINOPATHY: NEGATIVE

## 2020-12-22 PROCEDURE — 99999 PR PBB SHADOW E&M-EST. PATIENT-LVL III: CPT | Mod: PBBFAC,,, | Performed by: NURSE PRACTITIONER

## 2020-12-22 PROCEDURE — 3008F BODY MASS INDEX DOCD: CPT | Mod: CPTII,S$GLB,, | Performed by: NURSE PRACTITIONER

## 2020-12-22 PROCEDURE — 99999 PR PBB SHADOW E&M-EST. PATIENT-LVL III: ICD-10-PCS | Mod: PBBFAC,,, | Performed by: NURSE PRACTITIONER

## 2020-12-22 PROCEDURE — 99244 OFF/OP CNSLTJ NEW/EST MOD 40: CPT | Mod: S$GLB,,, | Performed by: NURSE PRACTITIONER

## 2020-12-22 PROCEDURE — 3008F PR BODY MASS INDEX (BMI) DOCUMENTED: ICD-10-PCS | Mod: CPTII,S$GLB,, | Performed by: NURSE PRACTITIONER

## 2020-12-22 PROCEDURE — 99244 PR OFFICE CONSULTATION,LEVEL IV: ICD-10-PCS | Mod: S$GLB,,, | Performed by: NURSE PRACTITIONER

## 2020-12-22 NOTE — LETTER
December 22, 2020      ARUN Monson MD  [address]  [place] [address]           [facility]  [address]  [place] [address]  Phone: [phone]  Fax: [phone]          Patient: Josesito Crabtree   MR Number: [id number]   YOB: 1980   Date of Visit: 12/22/2020       Dear Dr. ARUN Monson:    Thank you for referring Josesito Crabtree to me for evaluation. Attached you will find relevant portions of my assessment and plan of care.    If you have questions, please do not hesitate to call me. I look forward to following Josesito Crabtree along with you.    Sincerely,    Elizabeth Lejeune, NP    Enclosure  CC:  No Recipients    If you would like to receive this communication electronically, please contact [email or web address] or [phone] to request more information on [employer] Link access.    For providers and/or their staff who would like to refer a patient to [facility], please contact us through our one-stop-shop provider referral line, [facility], at [phone].    If you feel you have received this communication in error or would no longer like to receive these types of communications, please e-mail [email or web address]

## 2020-12-22 NOTE — PROGRESS NOTES
"Subjective:      Patient ID: Josesito Crabtree is a 40 y.o. male.    Chief Complaint: Sleep Apnea    HPI  Consult for sleep apnea. Loud snoring which is worse over the last 8 months.   Working on weight loss. BMI 49. Diagnosed with HTN this year.   He had a HST.     Patient Active Problem List   Diagnosis    Morbid obesity with body mass index (BMI) greater than or equal to 50    Essential hypertension    Cigarette nicotine dependence, uncomplicated    Bilateral chronic knee pain    Acanthosis nigricans    Obstructive sleep apnea syndrome    Chronic venous hypertension involving both sides    Immunizations not carried out because of patient refusal    Type 2 diabetes mellitus with other specified complication    Dyslipidemia associated with type 2 diabetes mellitus       BP (!) 142/86   Pulse 104   Resp 19   Ht 6' 2" (1.88 m)   Wt (!) 176.4 kg (388 lb 14.3 oz)   SpO2 97%   BMI 49.93 kg/m²   Body mass index is 49.93 kg/m².    Review of Systems   Respiratory: Positive for snoring.    All other systems reviewed and are negative.    Objective:      Physical Exam  Constitutional:       Appearance: He is well-developed. He is obese.   HENT:      Head: Normocephalic and atraumatic.      Mouth/Throat:      Comments: Wearing mask due to COVID-19 protocol  Neck:      Musculoskeletal: Normal range of motion and neck supple.      Thyroid: No thyroid mass or thyromegaly.      Trachea: Trachea normal.   Cardiovascular:      Rate and Rhythm: Normal rate and regular rhythm.      Heart sounds: Normal heart sounds.   Pulmonary:      Effort: Pulmonary effort is normal.      Breath sounds: Normal breath sounds. No wheezing, rhonchi or rales.   Chest:      Chest wall: There is no dullness to percussion.   Abdominal:      Palpations: Abdomen is soft. There is no splenomegaly or mass.      Tenderness: There is no abdominal tenderness.   Musculoskeletal: Normal range of motion.   Skin:     General: Skin is warm and dry. "   Neurological:      Mental Status: He is alert and oriented to person, place, and time.   Psychiatric:         Mood and Affect: Mood normal.         Behavior: Behavior normal.       Personal Diagnostic Review  Home Sleep Studies   Date/Time: 11/10/2020 8:00 AM  Performed by: Estrada Potts MD  Authorized by: ARUN Monson MD        PHYSICIAN INTERPRETATION AND COMMENTS: Findings are consistent with moderate, non-positional obstructive sleep  apnea (BECKA).Overall AHI was 21.0/hr, Loud snoring. Please refer to sleep disorder clinic for evaluation and management. CPAP  titration or AutoPAP 5-20 cmwp with mask of choice.  CLINICAL HISTORY: 40 year old male presented with: 18.5 inch neck, BMI of 49, an [place] sleepiness score of 4, history of  hypertension, diabetes, a previous diagnosis of BECKA, daily use of supplemental oxygen and symptoms of nocturnal snoring. Based on  the clinical history, the patient has a high pre-test probability of having severe BECKA. STOPBANG score 6.     Assessment:       1. Morbid obesity with BMI of 45.0-49.9, adult    2. Obstructive sleep apnea syndrome    3. Hypertension, unspecified type    4. Shift work sleep disorder        Outpatient Encounter Medications as of 12/22/2020   Medication Sig Dispense Refill    atorvastatin (LIPITOR) 20 MG tablet Take 1 tablet (20 mg total) by mouth every evening. (FOR HEART HEALTH) 90 tablet 4    chlorthalidone (HYGROTEN) 25 MG Tab Take 1 tablet by mouth once daily 30 tablet 0    metFORMIN (GLUCOPHAGE-XR) 500 MG ER 24hr tablet Take 1 tablet (500 mg total) by mouth daily with breakfast for 7 days, THEN 1 tablet (500 mg total) 2 (two) times daily with meals. 180 tablet 0     No facility-administered encounter medications on file as of 12/22/2020.      Orders Placed This Encounter   Procedures    CPAP FOR HOME USE     Order Specific Question:   Length of need (1-99 months):     Answer:   99     Order Specific Question:   Type ():      Answer:   Auto CPAP     Order Specific Question:   Auto CPAP pressure setting range (cmH20):     Answer:   6-20     Order Specific Question:   Humidification ():     Answer:   Heated     Order Specific Question:   Choose ONE mask type and its corresponding cushions and/or pillows:     Answer:    Nasal Pillow Mask, 1 per 90 days:  Nasal Pillows, (6 per 90 days)     Order Specific Question:   Choose EITHER Heated or Non-Heated Tubjing     Answer:    Non-Heated Tubing, 1 per 90 days     Order Specific Question:   All other supplies as needed as listed below:     Answer:    Headgear, 1 per 180 days     Order Specific Question:   All other supplies as needed as listed below:     Answer:    Disposable Filter, 6 per 90 days     Order Specific Question:   All other supplies as needed as listed below:     Answer:    Non-Disposable Filter, 1 per 180 days     Order Specific Question:   All other supplies as needed as listed below:     Answer:    Humidifier Chamber, 1 per 180 days     Plan:   AutoPAP and follow up in 10 weeks with download of data card and review of symptoms.     Problem List Items Addressed This Visit        Other    Obstructive sleep apnea syndrome    Relevant Orders    CPAP FOR HOME USE      Other Visit Diagnoses     Morbid obesity with BMI of 45.0-49.9, adult    -  Primary    Hypertension, unspecified type        Shift work sleep disorder          Weight loss and exercise to improve overall health.

## 2020-12-22 NOTE — PATIENT INSTRUCTIONS
Your provider has ordered you a AutoPAP machine. An order has been sent to a medical equipment company. The medical equipment company will send all the needed information to your insurance provider for approval. Shortly, you will be receiving a phone call about scheduling you for AutoPAP set up. If you do not hear from the company within 10 business days, please make us aware by calling us or by sending a message through the patient portal online. You can also call them directly at   [facility]   Toll free 24 hr line for assistance: [phone]   Option 1: CPAP  (press 1 - supplies ([employer]), press 2 questions regarding your machine)  Option 2: Oxygen, Nebulizer, Ventilator  Option 3: service  Option 4: Discharge (, providers, nurses)  Option 5: Diabetics  Option 6: Ortho (ortho braces, walker, wheelchairs, etc)  Option 7: Billing  You will also need to follow back up in the clinic with your provider in 10 weeks to review compliance of your AutoPAP. Your insurance requires documented compliance (wearing over 4hrs a night). Please bring the AutoPAP with you to the follow up visit.       What Are Snoring and Obstructive Sleep Apnea?  If youve ever had a stuffed-up nose, you know the feeling of trying to breathe through a very narrow passageway. This is what happens in your throat when you snore. While you sleep, structures in your throat partially block your air passage, making the passage narrow and hard to breathe through. If the entire passage becomes blocked and you cant breathe at all, you have sleep apnea.      Snoring Obstructive sleep apnea   Snoring  If your throat structures are too large or the muscles relax too much during sleep, the air passage may be partially blocked. As air from the nose or mouth passes around this blockage, the throat structures vibrate, causing the familiar sound of snoring. At times, this sound can be so loud that snorers wake up  others, or even themselves, during the night. Snoring gets worse as more and more of the air passage is blocked.  Obstructive sleep apnea  If the structures completely block the throat, air cant flow to the lungs at all. This is called apnea (meaning no breathing). Since the lungs arent getting fresh air, the brain tells the body to wake up just enough to tighten the muscles and unblock the air passage. With a loud gasp, breathing begins again. This process may be repeated over and over again throughout the night, making your sleep fragmented with a lighter stage of sleep. Even though you do not remember waking up many times during the night to a lighter sleep, you feel tired the next day. The lack of sleep and fresh air can also strain your lungs, heart, and other organs, leading to problems such as high blood pressure, heart attack, or stroke.  Problems in the nose and jaw  Problems in the structure of the nose may obstruct breathing. A crooked (deviated) septum or swollen turbinates can make snoring worse or lead to apnea. Also, a receding jaw may make the tongue sit too far back, so its more likely to block the airway when youre asleep.        Date Last Reviewed: 7/18/2015  © 7146-2109 [employer]. [address] [place] [address], [place] [address]. All rights reserved. This information is not intended as a substitute for professional medical care. Always follow your healthcare professional's instructions.        Continuous Positive Air Pressure (CPAP)     A mask over the nose gently directs air into the throat to keep the airway open.   Continuous positive air pressure (CPAP) uses gentle air pressure to hold the airway open. CPAP is often the most effective treatment for sleep apnea and severe snoring. It works very well for many people. But keep in mind that it can take several adjustments before the setup is right for you.  How CPAP works  The CPAP machine  is a small portable pump beside the bed.  The pump sends air through a hose, which is held over your nose and mouth by a mask. Mild air pressure is gently pushed through your airway. The air pressure nudges sagging tissues aside. This widens the airway so you can breathe better. CPAP may be combined with other kinds of therapy for sleep apnea.  Types of air pressure treatments  There are different types of CPAP. Your doctor or CPAP technician will help you decide which type is best for you:  · Basic CPAP keeps the pressure constant all night long.  · A bilevel device (BiPAP) provides more pressure when you breathe in and less when you breathe out. A BiPAP machine also may be set to provide automatic breaths to maintain breathing if you stop breathing while sleeping.  · An autoCPAP device automatically adjusts pressure throughout the night and in response to changes such as body position, sleep stage, and snoring.  Date Last Reviewed: 8/10/2015  © 8412-4472 [employer]. [address] [place] [address], [place] [address]. All rights reserved. This information is not intended as a substitute for professional medical care. Always follow your healthcare professional's instructions.

## 2020-12-30 ENCOUNTER — TELEPHONE (OUTPATIENT)
Dept: INTERNAL MEDICINE | Facility: CLINIC | Age: 40
End: 2020-12-30

## 2020-12-30 NOTE — TELEPHONE ENCOUNTER
----- Message from Yuridia Scruggs sent at 12/30/2020  6:44 AM CST -----  Regarding: covid test  Contact: wife  Wife states she tested positive for covid yesterday. Callng on pt and gettting tested also. Please call Marla Ceron at [phone].

## 2020-12-30 NOTE — TELEPHONE ENCOUNTER
Spoke w/ pt's wife, had advised her to have her  go to the . She stated he was currently @ [facility]. No additional F/U necessary/jj

## 2021-01-07 ENCOUNTER — TELEPHONE (OUTPATIENT)
Dept: INTERNAL MEDICINE | Facility: CLINIC | Age: 41
End: 2021-01-07

## 2021-02-11 ENCOUNTER — PATIENT OUTREACH (OUTPATIENT)
Dept: ADMINISTRATIVE | Facility: HOSPITAL | Age: 41
End: 2021-02-11

## 2021-02-25 ENCOUNTER — PATIENT MESSAGE (OUTPATIENT)
Dept: PULMONOLOGY | Facility: CLINIC | Age: 41
End: 2021-02-25

## 2021-02-25 ENCOUNTER — PATIENT OUTREACH (OUTPATIENT)
Dept: ADMINISTRATIVE | Facility: HOSPITAL | Age: 41
End: 2021-02-25

## 2021-03-24 ENCOUNTER — IMMUNIZATION (OUTPATIENT)
Dept: INTERNAL MEDICINE | Facility: CLINIC | Age: 41
End: 2021-03-24
Payer: COMMERCIAL

## 2021-03-24 DIAGNOSIS — Z23 NEED FOR VACCINATION: Primary | ICD-10-CM

## 2021-03-24 PROCEDURE — 91300 COVID-19, MRNA, LNP-S, PF, 30 MCG/0.3 ML DOSE VACCINE: CPT | Mod: PBBFAC | Performed by: FAMILY MEDICINE

## 2021-04-14 ENCOUNTER — IMMUNIZATION (OUTPATIENT)
Dept: INTERNAL MEDICINE | Facility: CLINIC | Age: 41
End: 2021-04-14
Payer: COMMERCIAL

## 2021-04-14 DIAGNOSIS — Z23 NEED FOR VACCINATION: Primary | ICD-10-CM

## 2021-04-14 PROCEDURE — 91300 COVID-19, MRNA, LNP-S, PF, 30 MCG/0.3 ML DOSE VACCINE: CPT | Mod: PBBFAC | Performed by: FAMILY MEDICINE

## 2021-04-14 PROCEDURE — 0002A COVID-19, MRNA, LNP-S, PF, 30 MCG/0.3 ML DOSE VACCINE: CPT | Mod: PBBFAC | Performed by: FAMILY MEDICINE

## 2021-04-20 ENCOUNTER — OFFICE VISIT (OUTPATIENT)
Dept: SLEEP MEDICINE | Facility: CLINIC | Age: 41
End: 2021-04-20
Payer: COMMERCIAL

## 2021-04-20 VITALS
HEIGHT: 74 IN | HEART RATE: 79 BPM | DIASTOLIC BLOOD PRESSURE: 82 MMHG | BODY MASS INDEX: 40.43 KG/M2 | WEIGHT: 315 LBS | OXYGEN SATURATION: 97 % | RESPIRATION RATE: 18 BRPM | SYSTOLIC BLOOD PRESSURE: 140 MMHG

## 2021-04-20 DIAGNOSIS — G47.33 OBSTRUCTIVE SLEEP APNEA SYNDROME: Primary | ICD-10-CM

## 2021-04-20 DIAGNOSIS — G47.26 SHIFT WORK SLEEP DISORDER: ICD-10-CM

## 2021-04-20 DIAGNOSIS — E66.01 MORBID OBESITY WITH BMI OF 45.0-49.9, ADULT: ICD-10-CM

## 2021-04-20 DIAGNOSIS — I10 HYPERTENSION, UNSPECIFIED TYPE: ICD-10-CM

## 2021-04-20 PROCEDURE — 99999 PR PBB SHADOW E&M-EST. PATIENT-LVL III: ICD-10-PCS | Mod: PBBFAC,,, | Performed by: NURSE PRACTITIONER

## 2021-04-20 PROCEDURE — 99999 PR PBB SHADOW E&M-EST. PATIENT-LVL III: CPT | Mod: PBBFAC,,, | Performed by: NURSE PRACTITIONER

## 2021-04-20 PROCEDURE — 3008F PR BODY MASS INDEX (BMI) DOCUMENTED: ICD-10-PCS | Mod: CPTII,S$GLB,, | Performed by: NURSE PRACTITIONER

## 2021-04-20 PROCEDURE — 99214 OFFICE O/P EST MOD 30 MIN: CPT | Mod: S$GLB,,, | Performed by: NURSE PRACTITIONER

## 2021-04-20 PROCEDURE — 3008F BODY MASS INDEX DOCD: CPT | Mod: CPTII,S$GLB,, | Performed by: NURSE PRACTITIONER

## 2021-04-20 PROCEDURE — 99214 PR OFFICE/OUTPT VISIT, EST, LEVL IV, 30-39 MIN: ICD-10-PCS | Mod: S$GLB,,, | Performed by: NURSE PRACTITIONER

## 2021-06-08 ENCOUNTER — LAB VISIT (OUTPATIENT)
Dept: LAB | Facility: HOSPITAL | Age: 41
End: 2021-06-08
Attending: FAMILY MEDICINE
Payer: COMMERCIAL

## 2021-06-08 ENCOUNTER — OFFICE VISIT (OUTPATIENT)
Dept: INTERNAL MEDICINE | Facility: CLINIC | Age: 41
End: 2021-06-08
Payer: COMMERCIAL

## 2021-06-08 VITALS
TEMPERATURE: 98 F | BODY MASS INDEX: 40.43 KG/M2 | HEIGHT: 74 IN | DIASTOLIC BLOOD PRESSURE: 96 MMHG | SYSTOLIC BLOOD PRESSURE: 146 MMHG | OXYGEN SATURATION: 97 % | HEART RATE: 86 BPM | WEIGHT: 315 LBS

## 2021-06-08 DIAGNOSIS — E78.5 DYSLIPIDEMIA ASSOCIATED WITH TYPE 2 DIABETES MELLITUS: ICD-10-CM

## 2021-06-08 DIAGNOSIS — E11.69 TYPE 2 DIABETES MELLITUS WITH OTHER SPECIFIED COMPLICATION, WITHOUT LONG-TERM CURRENT USE OF INSULIN: Chronic | ICD-10-CM

## 2021-06-08 DIAGNOSIS — G47.33 OBSTRUCTIVE SLEEP APNEA SYNDROME: ICD-10-CM

## 2021-06-08 DIAGNOSIS — E11.69 DYSLIPIDEMIA ASSOCIATED WITH TYPE 2 DIABETES MELLITUS: ICD-10-CM

## 2021-06-08 DIAGNOSIS — M67.432 GANGLION CYST OF VOLAR ASPECT OF LEFT WRIST: ICD-10-CM

## 2021-06-08 DIAGNOSIS — I10 ESSENTIAL HYPERTENSION: ICD-10-CM

## 2021-06-08 DIAGNOSIS — E66.01 MORBID OBESITY WITH BODY MASS INDEX (BMI) GREATER THAN OR EQUAL TO 50: ICD-10-CM

## 2021-06-08 DIAGNOSIS — I10 ESSENTIAL HYPERTENSION: Primary | ICD-10-CM

## 2021-06-08 LAB
ALBUMIN SERPL BCP-MCNC: 3.8 G/DL (ref 3.5–5.2)
ALP SERPL-CCNC: 70 U/L (ref 55–135)
ALT SERPL W/O P-5'-P-CCNC: 53 U/L (ref 10–44)
ANION GAP SERPL CALC-SCNC: 9 MMOL/L (ref 8–16)
AST SERPL-CCNC: 29 U/L (ref 10–40)
BILIRUB SERPL-MCNC: 0.4 MG/DL (ref 0.1–1)
BUN SERPL-MCNC: 11 MG/DL (ref 6–20)
CALCIUM SERPL-MCNC: 9.3 MG/DL (ref 8.7–10.5)
CHLORIDE SERPL-SCNC: 105 MMOL/L (ref 95–110)
CHOLEST SERPL-MCNC: 160 MG/DL (ref 120–199)
CHOLEST/HDLC SERPL: 3.9 {RATIO} (ref 2–5)
CO2 SERPL-SCNC: 26 MMOL/L (ref 23–29)
CREAT SERPL-MCNC: 1 MG/DL (ref 0.5–1.4)
EST. GFR  (AFRICAN AMERICAN): >60 ML/MIN/1.73 M^2
EST. GFR  (NON AFRICAN AMERICAN): >60 ML/MIN/1.73 M^2
ESTIMATED AVG GLUCOSE: 197 MG/DL (ref 68–131)
GLUCOSE SERPL-MCNC: 165 MG/DL (ref 70–110)
HBA1C MFR BLD: 8.5 % (ref 4–5.6)
HDLC SERPL-MCNC: 41 MG/DL (ref 40–75)
HDLC SERPL: 25.6 % (ref 20–50)
LDLC SERPL CALC-MCNC: 101.4 MG/DL (ref 63–159)
NONHDLC SERPL-MCNC: 119 MG/DL
POTASSIUM SERPL-SCNC: 4.3 MMOL/L (ref 3.5–5.1)
PROT SERPL-MCNC: 7.1 G/DL (ref 6–8.4)
SODIUM SERPL-SCNC: 140 MMOL/L (ref 136–145)
TRIGL SERPL-MCNC: 88 MG/DL (ref 30–150)

## 2021-06-08 PROCEDURE — 99214 OFFICE O/P EST MOD 30 MIN: CPT | Mod: S$GLB,,, | Performed by: FAMILY MEDICINE

## 2021-06-08 PROCEDURE — 80053 COMPREHEN METABOLIC PANEL: CPT | Performed by: FAMILY MEDICINE

## 2021-06-08 PROCEDURE — 99999 PR PBB SHADOW E&M-EST. PATIENT-LVL III: CPT | Mod: PBBFAC,,, | Performed by: FAMILY MEDICINE

## 2021-06-08 PROCEDURE — 3080F DIAST BP >= 90 MM HG: CPT | Mod: CPTII,S$GLB,, | Performed by: FAMILY MEDICINE

## 2021-06-08 PROCEDURE — 3008F BODY MASS INDEX DOCD: CPT | Mod: CPTII,S$GLB,, | Performed by: FAMILY MEDICINE

## 2021-06-08 PROCEDURE — 80061 LIPID PANEL: CPT | Performed by: FAMILY MEDICINE

## 2021-06-08 PROCEDURE — 3052F HG A1C>EQUAL 8.0%<EQUAL 9.0%: CPT | Mod: CPTII,S$GLB,, | Performed by: FAMILY MEDICINE

## 2021-06-08 PROCEDURE — 99999 PR PBB SHADOW E&M-EST. PATIENT-LVL III: ICD-10-PCS | Mod: PBBFAC,,, | Performed by: FAMILY MEDICINE

## 2021-06-08 PROCEDURE — 3077F SYST BP >= 140 MM HG: CPT | Mod: CPTII,S$GLB,, | Performed by: FAMILY MEDICINE

## 2021-06-08 PROCEDURE — 83036 HEMOGLOBIN GLYCOSYLATED A1C: CPT | Performed by: FAMILY MEDICINE

## 2021-06-08 PROCEDURE — 1125F AMNT PAIN NOTED PAIN PRSNT: CPT | Mod: S$GLB,,, | Performed by: FAMILY MEDICINE

## 2021-06-08 PROCEDURE — 99214 PR OFFICE/OUTPT VISIT, EST, LEVL IV, 30-39 MIN: ICD-10-PCS | Mod: S$GLB,,, | Performed by: FAMILY MEDICINE

## 2021-06-08 PROCEDURE — 1125F PR PAIN SEVERITY QUANTIFIED, PAIN PRESENT: ICD-10-PCS | Mod: S$GLB,,, | Performed by: FAMILY MEDICINE

## 2021-06-08 PROCEDURE — 36415 COLL VENOUS BLD VENIPUNCTURE: CPT | Performed by: FAMILY MEDICINE

## 2021-06-08 PROCEDURE — 3080F PR MOST RECENT DIASTOLIC BLOOD PRESSURE >= 90 MM HG: ICD-10-PCS | Mod: CPTII,S$GLB,, | Performed by: FAMILY MEDICINE

## 2021-06-08 PROCEDURE — 3077F PR MOST RECENT SYSTOLIC BLOOD PRESSURE >= 140 MM HG: ICD-10-PCS | Mod: CPTII,S$GLB,, | Performed by: FAMILY MEDICINE

## 2021-06-08 PROCEDURE — 3008F PR BODY MASS INDEX (BMI) DOCUMENTED: ICD-10-PCS | Mod: CPTII,S$GLB,, | Performed by: FAMILY MEDICINE

## 2021-06-08 PROCEDURE — 3052F PR MOST RECENT HEMOGLOBIN A1C LEVEL 8.0 - < 9.0%: ICD-10-PCS | Mod: CPTII,S$GLB,, | Performed by: FAMILY MEDICINE

## 2021-06-08 RX ORDER — METFORMIN HYDROCHLORIDE 500 MG/1
500 TABLET ORAL 2 TIMES DAILY WITH MEALS
Qty: 60 TABLET | Refills: 1 | Status: SHIPPED | OUTPATIENT
Start: 2021-06-08 | End: 2021-06-13 | Stop reason: DRUGHIGH

## 2021-06-08 RX ORDER — ATORVASTATIN CALCIUM 20 MG/1
20 TABLET, FILM COATED ORAL NIGHTLY
Qty: 30 TABLET | Refills: 1 | Status: SHIPPED | OUTPATIENT
Start: 2021-06-08 | End: 2021-06-13 | Stop reason: SDUPTHER

## 2021-06-08 RX ORDER — LOSARTAN POTASSIUM AND HYDROCHLOROTHIAZIDE 12.5; 5 MG/1; MG/1
1 TABLET ORAL NIGHTLY
Qty: 30 TABLET | Refills: 1 | Status: SHIPPED | OUTPATIENT
Start: 2021-06-08 | End: 2021-07-09 | Stop reason: SDUPTHER

## 2021-06-08 RX ORDER — CHLORTHALIDONE 25 MG/1
25 TABLET ORAL DAILY
Qty: 30 TABLET | Refills: 0 | Status: CANCELLED | OUTPATIENT
Start: 2021-06-08

## 2021-06-08 RX ORDER — METFORMIN HYDROCHLORIDE 500 MG/1
TABLET, EXTENDED RELEASE ORAL
Qty: 180 TABLET | Refills: 0 | Status: CANCELLED | OUTPATIENT
Start: 2021-06-08 | End: 2021-09-13

## 2021-06-13 ENCOUNTER — PATIENT MESSAGE (OUTPATIENT)
Dept: INTERNAL MEDICINE | Facility: CLINIC | Age: 41
End: 2021-06-13

## 2021-06-13 DIAGNOSIS — E78.5 DYSLIPIDEMIA ASSOCIATED WITH TYPE 2 DIABETES MELLITUS: ICD-10-CM

## 2021-06-13 DIAGNOSIS — E11.69 TYPE 2 DIABETES MELLITUS WITH OTHER SPECIFIED COMPLICATION, WITHOUT LONG-TERM CURRENT USE OF INSULIN: Primary | Chronic | ICD-10-CM

## 2021-06-13 DIAGNOSIS — E11.69 DYSLIPIDEMIA ASSOCIATED WITH TYPE 2 DIABETES MELLITUS: ICD-10-CM

## 2021-06-13 DIAGNOSIS — E11.65 TYPE 2 DIABETES MELLITUS WITH HYPERGLYCEMIA, WITHOUT LONG-TERM CURRENT USE OF INSULIN: ICD-10-CM

## 2021-06-13 RX ORDER — GLIMEPIRIDE 2 MG/1
2 TABLET ORAL
Qty: 30 TABLET | Refills: 1 | Status: SHIPPED | OUTPATIENT
Start: 2021-06-13 | End: 2021-07-09 | Stop reason: SDUPTHER

## 2021-06-13 RX ORDER — LANCETS
EACH MISCELLANEOUS
Qty: 100 EACH | Refills: 11 | Status: SHIPPED | OUTPATIENT
Start: 2021-06-13

## 2021-06-13 RX ORDER — METFORMIN HYDROCHLORIDE 500 MG/1
1000 TABLET ORAL 2 TIMES DAILY WITH MEALS
Qty: 360 TABLET | Refills: 0 | Status: SHIPPED | OUTPATIENT
Start: 2021-06-13 | End: 2021-07-09 | Stop reason: SDUPTHER

## 2021-06-13 RX ORDER — INSULIN PUMP SYRINGE, 3 ML
EACH MISCELLANEOUS
Qty: 1 EACH | Refills: 0 | Status: SHIPPED | OUTPATIENT
Start: 2021-06-13

## 2021-06-13 RX ORDER — ATORVASTATIN CALCIUM 20 MG/1
20 TABLET, FILM COATED ORAL NIGHTLY
Qty: 90 TABLET | Refills: 1 | Status: SHIPPED | OUTPATIENT
Start: 2021-06-13 | End: 2021-07-09 | Stop reason: ALTCHOICE

## 2021-06-14 ENCOUNTER — TELEPHONE (OUTPATIENT)
Dept: DIABETES | Facility: CLINIC | Age: 41
End: 2021-06-14

## 2021-06-14 DIAGNOSIS — E11.65 TYPE 2 DIABETES MELLITUS WITH HYPERGLYCEMIA, WITHOUT LONG-TERM CURRENT USE OF INSULIN: ICD-10-CM

## 2021-06-14 DIAGNOSIS — E11.69 TYPE 2 DIABETES MELLITUS WITH OTHER SPECIFIED COMPLICATION, WITHOUT LONG-TERM CURRENT USE OF INSULIN: Chronic | ICD-10-CM

## 2021-06-15 ENCOUNTER — TELEPHONE (OUTPATIENT)
Dept: INTERNAL MEDICINE | Facility: CLINIC | Age: 41
End: 2021-06-15

## 2021-06-22 ENCOUNTER — CLINICAL SUPPORT (OUTPATIENT)
Dept: INTERNAL MEDICINE | Facility: CLINIC | Age: 41
End: 2021-06-22
Payer: COMMERCIAL

## 2021-06-22 VITALS — HEART RATE: 79 BPM | SYSTOLIC BLOOD PRESSURE: 132 MMHG | OXYGEN SATURATION: 98 % | DIASTOLIC BLOOD PRESSURE: 80 MMHG

## 2021-06-22 PROCEDURE — 99999 PR PBB SHADOW E&M-EST. PATIENT-LVL I: ICD-10-PCS | Mod: PBBFAC,,,

## 2021-06-22 PROCEDURE — 99999 PR PBB SHADOW E&M-EST. PATIENT-LVL I: CPT | Mod: PBBFAC,,,

## 2021-07-09 ENCOUNTER — OFFICE VISIT (OUTPATIENT)
Dept: INTERNAL MEDICINE | Facility: CLINIC | Age: 41
End: 2021-07-09
Payer: COMMERCIAL

## 2021-07-09 VITALS
BODY MASS INDEX: 40.43 KG/M2 | TEMPERATURE: 98 F | DIASTOLIC BLOOD PRESSURE: 70 MMHG | HEIGHT: 74 IN | WEIGHT: 315 LBS | HEART RATE: 103 BPM | OXYGEN SATURATION: 95 % | SYSTOLIC BLOOD PRESSURE: 122 MMHG

## 2021-07-09 DIAGNOSIS — E66.01 MORBID OBESITY WITH BMI OF 45.0-49.9, ADULT: Chronic | ICD-10-CM

## 2021-07-09 DIAGNOSIS — E11.65 TYPE 2 DIABETES MELLITUS WITH HYPERGLYCEMIA, WITHOUT LONG-TERM CURRENT USE OF INSULIN: Chronic | ICD-10-CM

## 2021-07-09 DIAGNOSIS — E11.69 DYSLIPIDEMIA ASSOCIATED WITH TYPE 2 DIABETES MELLITUS: ICD-10-CM

## 2021-07-09 DIAGNOSIS — E11.69 TYPE 2 DIABETES MELLITUS WITH OTHER SPECIFIED COMPLICATION, WITHOUT LONG-TERM CURRENT USE OF INSULIN: Chronic | ICD-10-CM

## 2021-07-09 DIAGNOSIS — I10 ESSENTIAL HYPERTENSION: ICD-10-CM

## 2021-07-09 DIAGNOSIS — E78.5 DYSLIPIDEMIA ASSOCIATED WITH TYPE 2 DIABETES MELLITUS: ICD-10-CM

## 2021-07-09 PROCEDURE — 1160F PR REVIEW ALL MEDS BY PRESCRIBER/CLIN PHARMACIST DOCUMENTED: ICD-10-PCS | Mod: CPTII,S$GLB,, | Performed by: FAMILY MEDICINE

## 2021-07-09 PROCEDURE — 3074F SYST BP LT 130 MM HG: CPT | Mod: CPTII,S$GLB,, | Performed by: FAMILY MEDICINE

## 2021-07-09 PROCEDURE — 3052F PR MOST RECENT HEMOGLOBIN A1C LEVEL 8.0 - < 9.0%: ICD-10-PCS | Mod: CPTII,S$GLB,, | Performed by: FAMILY MEDICINE

## 2021-07-09 PROCEDURE — 3078F DIAST BP <80 MM HG: CPT | Mod: CPTII,S$GLB,, | Performed by: FAMILY MEDICINE

## 2021-07-09 PROCEDURE — 99214 PR OFFICE/OUTPT VISIT, EST, LEVL IV, 30-39 MIN: ICD-10-PCS | Mod: S$GLB,,, | Performed by: FAMILY MEDICINE

## 2021-07-09 PROCEDURE — 3052F HG A1C>EQUAL 8.0%<EQUAL 9.0%: CPT | Mod: CPTII,S$GLB,, | Performed by: FAMILY MEDICINE

## 2021-07-09 PROCEDURE — 99999 PR PBB SHADOW E&M-EST. PATIENT-LVL III: ICD-10-PCS | Mod: PBBFAC,,, | Performed by: FAMILY MEDICINE

## 2021-07-09 PROCEDURE — 1160F RVW MEDS BY RX/DR IN RCRD: CPT | Mod: CPTII,S$GLB,, | Performed by: FAMILY MEDICINE

## 2021-07-09 PROCEDURE — 1126F PR PAIN SEVERITY QUANTIFIED, NO PAIN PRESENT: ICD-10-PCS | Mod: CPTII,S$GLB,, | Performed by: FAMILY MEDICINE

## 2021-07-09 PROCEDURE — 99214 OFFICE O/P EST MOD 30 MIN: CPT | Mod: S$GLB,,, | Performed by: FAMILY MEDICINE

## 2021-07-09 PROCEDURE — 1159F PR MEDICATION LIST DOCUMENTED IN MEDICAL RECORD: ICD-10-PCS | Mod: CPTII,S$GLB,, | Performed by: FAMILY MEDICINE

## 2021-07-09 PROCEDURE — 1159F MED LIST DOCD IN RCRD: CPT | Mod: CPTII,S$GLB,, | Performed by: FAMILY MEDICINE

## 2021-07-09 PROCEDURE — 3008F PR BODY MASS INDEX (BMI) DOCUMENTED: ICD-10-PCS | Mod: CPTII,S$GLB,, | Performed by: FAMILY MEDICINE

## 2021-07-09 PROCEDURE — 99999 PR PBB SHADOW E&M-EST. PATIENT-LVL III: CPT | Mod: PBBFAC,,, | Performed by: FAMILY MEDICINE

## 2021-07-09 PROCEDURE — 1126F AMNT PAIN NOTED NONE PRSNT: CPT | Mod: CPTII,S$GLB,, | Performed by: FAMILY MEDICINE

## 2021-07-09 PROCEDURE — 3008F BODY MASS INDEX DOCD: CPT | Mod: CPTII,S$GLB,, | Performed by: FAMILY MEDICINE

## 2021-07-09 PROCEDURE — 3074F PR MOST RECENT SYSTOLIC BLOOD PRESSURE < 130 MM HG: ICD-10-PCS | Mod: CPTII,S$GLB,, | Performed by: FAMILY MEDICINE

## 2021-07-09 PROCEDURE — 3078F PR MOST RECENT DIASTOLIC BLOOD PRESSURE < 80 MM HG: ICD-10-PCS | Mod: CPTII,S$GLB,, | Performed by: FAMILY MEDICINE

## 2021-07-09 RX ORDER — DULAGLUTIDE 3 MG/.5ML
3 INJECTION, SOLUTION SUBCUTANEOUS
Qty: 4 PEN | Refills: 2 | Status: SHIPPED | OUTPATIENT
Start: 2021-08-09 | End: 2021-12-02

## 2021-07-09 RX ORDER — GLIMEPIRIDE 2 MG/1
2 TABLET ORAL
Qty: 90 TABLET | Refills: 0 | Status: SHIPPED | OUTPATIENT
Start: 2021-07-09 | End: 2021-12-02

## 2021-07-09 RX ORDER — LANCETS 33 GAUGE
EACH MISCELLANEOUS
COMMUNITY
Start: 2021-06-15

## 2021-07-09 RX ORDER — LANCETS 30 GAUGE
EACH MISCELLANEOUS
COMMUNITY
Start: 2021-06-15

## 2021-07-09 RX ORDER — LOSARTAN POTASSIUM AND HYDROCHLOROTHIAZIDE 12.5; 5 MG/1; MG/1
1 TABLET ORAL NIGHTLY
Qty: 90 TABLET | Refills: 0 | Status: SHIPPED | OUTPATIENT
Start: 2021-07-09 | End: 2021-08-11

## 2021-07-09 RX ORDER — METFORMIN HYDROCHLORIDE 500 MG/1
1000 TABLET ORAL 2 TIMES DAILY WITH MEALS
Qty: 360 TABLET | Refills: 0 | Status: SHIPPED | OUTPATIENT
Start: 2021-07-09 | End: 2021-08-10

## 2021-07-09 RX ORDER — ROSUVASTATIN CALCIUM 20 MG/1
20 TABLET, COATED ORAL DAILY
Qty: 90 TABLET | Refills: 3 | Status: SHIPPED | OUTPATIENT
Start: 2021-07-09 | End: 2021-12-02 | Stop reason: SDUPTHER

## 2021-08-05 ENCOUNTER — PATIENT MESSAGE (OUTPATIENT)
Dept: PULMONOLOGY | Facility: CLINIC | Age: 41
End: 2021-08-05

## 2021-08-09 ENCOUNTER — PATIENT OUTREACH (OUTPATIENT)
Dept: ADMINISTRATIVE | Facility: OTHER | Age: 41
End: 2021-08-09

## 2021-08-13 ENCOUNTER — OFFICE VISIT (OUTPATIENT)
Dept: PULMONOLOGY | Facility: CLINIC | Age: 41
End: 2021-08-13
Payer: COMMERCIAL

## 2021-08-13 VITALS — BODY MASS INDEX: 40.43 KG/M2 | WEIGHT: 315 LBS | HEIGHT: 74 IN

## 2021-08-13 DIAGNOSIS — G47.26 SHIFT WORK SLEEP DISORDER: ICD-10-CM

## 2021-08-13 DIAGNOSIS — G47.33 OBSTRUCTIVE SLEEP APNEA SYNDROME: ICD-10-CM

## 2021-08-13 DIAGNOSIS — G47.33 OSA (OBSTRUCTIVE SLEEP APNEA): Primary | ICD-10-CM

## 2021-08-13 PROCEDURE — 1160F PR REVIEW ALL MEDS BY PRESCRIBER/CLIN PHARMACIST DOCUMENTED: ICD-10-PCS | Mod: CPTII,,, | Performed by: NURSE PRACTITIONER

## 2021-08-13 PROCEDURE — 3052F PR MOST RECENT HEMOGLOBIN A1C LEVEL 8.0 - < 9.0%: ICD-10-PCS | Mod: CPTII,,, | Performed by: NURSE PRACTITIONER

## 2021-08-13 PROCEDURE — 1159F PR MEDICATION LIST DOCUMENTED IN MEDICAL RECORD: ICD-10-PCS | Mod: CPTII,,, | Performed by: NURSE PRACTITIONER

## 2021-08-13 PROCEDURE — 3052F HG A1C>EQUAL 8.0%<EQUAL 9.0%: CPT | Mod: CPTII,,, | Performed by: NURSE PRACTITIONER

## 2021-08-13 PROCEDURE — 1160F RVW MEDS BY RX/DR IN RCRD: CPT | Mod: CPTII,,, | Performed by: NURSE PRACTITIONER

## 2021-08-13 PROCEDURE — 3008F PR BODY MASS INDEX (BMI) DOCUMENTED: ICD-10-PCS | Mod: CPTII,,, | Performed by: NURSE PRACTITIONER

## 2021-08-13 PROCEDURE — 1159F MED LIST DOCD IN RCRD: CPT | Mod: CPTII,,, | Performed by: NURSE PRACTITIONER

## 2021-08-13 PROCEDURE — 3008F BODY MASS INDEX DOCD: CPT | Mod: CPTII,,, | Performed by: NURSE PRACTITIONER

## 2021-08-13 PROCEDURE — 99213 PR OFFICE/OUTPT VISIT, EST, LEVL III, 20-29 MIN: ICD-10-PCS | Mod: 95,,, | Performed by: NURSE PRACTITIONER

## 2021-08-13 PROCEDURE — 99213 OFFICE O/P EST LOW 20 MIN: CPT | Mod: 95,,, | Performed by: NURSE PRACTITIONER

## 2021-10-01 ENCOUNTER — LAB VISIT (OUTPATIENT)
Dept: LAB | Facility: HOSPITAL | Age: 41
End: 2021-10-01
Attending: FAMILY MEDICINE
Payer: COMMERCIAL

## 2021-10-01 DIAGNOSIS — E11.69 TYPE 2 DIABETES MELLITUS WITH OTHER SPECIFIED COMPLICATION, WITHOUT LONG-TERM CURRENT USE OF INSULIN: Chronic | ICD-10-CM

## 2021-10-01 LAB
ESTIMATED AVG GLUCOSE: 217 MG/DL (ref 68–131)
HBA1C MFR BLD: 9.2 % (ref 4–5.6)

## 2021-10-01 PROCEDURE — 83036 HEMOGLOBIN GLYCOSYLATED A1C: CPT | Performed by: FAMILY MEDICINE

## 2021-10-01 PROCEDURE — 36415 COLL VENOUS BLD VENIPUNCTURE: CPT | Performed by: FAMILY MEDICINE

## 2021-10-11 ENCOUNTER — TELEPHONE (OUTPATIENT)
Dept: INTERNAL MEDICINE | Facility: CLINIC | Age: 41
End: 2021-10-11

## 2021-11-20 DIAGNOSIS — I10 ESSENTIAL HYPERTENSION: ICD-10-CM

## 2021-11-22 RX ORDER — LOSARTAN POTASSIUM AND HYDROCHLOROTHIAZIDE 12.5; 5 MG/1; MG/1
TABLET ORAL
Qty: 90 TABLET | Refills: 0 | Status: SHIPPED | OUTPATIENT
Start: 2021-11-22 | End: 2021-12-02 | Stop reason: SDUPTHER

## 2021-12-02 ENCOUNTER — LAB VISIT (OUTPATIENT)
Dept: LAB | Facility: HOSPITAL | Age: 41
End: 2021-12-02
Attending: FAMILY MEDICINE
Payer: COMMERCIAL

## 2021-12-02 ENCOUNTER — TELEPHONE (OUTPATIENT)
Dept: INTERNAL MEDICINE | Facility: CLINIC | Age: 41
End: 2021-12-02

## 2021-12-02 ENCOUNTER — PATIENT MESSAGE (OUTPATIENT)
Dept: ADMINISTRATIVE | Facility: OTHER | Age: 41
End: 2021-12-02
Payer: COMMERCIAL

## 2021-12-02 ENCOUNTER — OFFICE VISIT (OUTPATIENT)
Dept: INTERNAL MEDICINE | Facility: CLINIC | Age: 41
End: 2021-12-02
Payer: COMMERCIAL

## 2021-12-02 ENCOUNTER — HOSPITAL ENCOUNTER (OUTPATIENT)
Dept: RADIOLOGY | Facility: HOSPITAL | Age: 41
Discharge: HOME | End: 2021-12-02
Attending: NURSE PRACTITIONER
Payer: COMMERCIAL

## 2021-12-02 VITALS
DIASTOLIC BLOOD PRESSURE: 70 MMHG | SYSTOLIC BLOOD PRESSURE: 120 MMHG | OXYGEN SATURATION: 96 % | WEIGHT: 315 LBS | HEART RATE: 94 BPM | TEMPERATURE: 98 F | BODY MASS INDEX: 40.43 KG/M2 | HEIGHT: 74 IN

## 2021-12-02 DIAGNOSIS — E11.65 TYPE 2 DIABETES MELLITUS WITH HYPERGLYCEMIA, WITHOUT LONG-TERM CURRENT USE OF INSULIN: Chronic | ICD-10-CM

## 2021-12-02 DIAGNOSIS — E78.5 DYSLIPIDEMIA ASSOCIATED WITH TYPE 2 DIABETES MELLITUS: ICD-10-CM

## 2021-12-02 DIAGNOSIS — I10 ESSENTIAL HYPERTENSION: ICD-10-CM

## 2021-12-02 DIAGNOSIS — E11.65 TYPE 2 DIABETES MELLITUS WITH HYPERGLYCEMIA, WITHOUT LONG-TERM CURRENT USE OF INSULIN: Primary | Chronic | ICD-10-CM

## 2021-12-02 DIAGNOSIS — E11.69 DYSLIPIDEMIA ASSOCIATED WITH TYPE 2 DIABETES MELLITUS: ICD-10-CM

## 2021-12-02 DIAGNOSIS — L30.1 DYSHIDROTIC ECZEMA: ICD-10-CM

## 2021-12-02 DIAGNOSIS — Z01.00 DIABETIC EYE EXAM: ICD-10-CM

## 2021-12-02 DIAGNOSIS — G47.33 OBSTRUCTIVE SLEEP APNEA SYNDROME: ICD-10-CM

## 2021-12-02 DIAGNOSIS — E11.9 DIABETIC EYE EXAM: ICD-10-CM

## 2021-12-02 DIAGNOSIS — E66.01 MORBID OBESITY WITH BMI OF 45.0-49.9, ADULT: Chronic | ICD-10-CM

## 2021-12-02 LAB
CHOLEST SERPL-MCNC: 96 MG/DL (ref 120–199)
CHOLEST/HDLC SERPL: 3.2 {RATIO} (ref 2–5)
HDLC SERPL-MCNC: 30 MG/DL (ref 40–75)
HDLC SERPL: 31.3 % (ref 20–50)
LDLC SERPL CALC-MCNC: 46.8 MG/DL (ref 63–159)
NONHDLC SERPL-MCNC: 66 MG/DL
TRIGL SERPL-MCNC: 96 MG/DL (ref 30–150)

## 2021-12-02 PROCEDURE — 99999 PR PBB SHADOW E&M-EST. PATIENT-LVL V: CPT | Mod: PBBFAC,,, | Performed by: FAMILY MEDICINE

## 2021-12-02 PROCEDURE — 82570 ASSAY OF URINE CREATININE: CPT | Performed by: FAMILY MEDICINE

## 2021-12-02 PROCEDURE — 83036 HEMOGLOBIN GLYCOSYLATED A1C: CPT | Performed by: FAMILY MEDICINE

## 2021-12-02 PROCEDURE — 36415 COLL VENOUS BLD VENIPUNCTURE: CPT | Performed by: FAMILY MEDICINE

## 2021-12-02 PROCEDURE — 99215 OFFICE O/P EST HI 40 MIN: CPT | Mod: S$GLB,,, | Performed by: FAMILY MEDICINE

## 2021-12-02 PROCEDURE — 80061 LIPID PANEL: CPT | Performed by: FAMILY MEDICINE

## 2021-12-02 PROCEDURE — 71046 XR CHEST PA AND LATERAL: ICD-10-PCS | Mod: 26,,, | Performed by: RADIOLOGY

## 2021-12-02 PROCEDURE — 99999 PR PBB SHADOW E&M-EST. PATIENT-LVL V: ICD-10-PCS | Mod: PBBFAC,,, | Performed by: FAMILY MEDICINE

## 2021-12-02 PROCEDURE — 71046 X-RAY EXAM CHEST 2 VIEWS: CPT | Mod: TC

## 2021-12-02 PROCEDURE — 99215 PR OFFICE/OUTPT VISIT, EST, LEVL V, 40-54 MIN: ICD-10-PCS | Mod: S$GLB,,, | Performed by: FAMILY MEDICINE

## 2021-12-02 PROCEDURE — 71046 X-RAY EXAM CHEST 2 VIEWS: CPT | Mod: 26,,, | Performed by: RADIOLOGY

## 2021-12-02 RX ORDER — ROSUVASTATIN CALCIUM 20 MG/1
20 TABLET, COATED ORAL DAILY
Qty: 90 TABLET | Refills: 0 | Status: SHIPPED | OUTPATIENT
Start: 2021-12-02 | End: 2022-10-11

## 2021-12-02 RX ORDER — LOSARTAN POTASSIUM AND HYDROCHLOROTHIAZIDE 12.5; 5 MG/1; MG/1
1 TABLET ORAL NIGHTLY
Qty: 90 TABLET | Refills: 0 | Status: SHIPPED | OUTPATIENT
Start: 2021-12-02 | End: 2022-02-11 | Stop reason: DRUGHIGH

## 2021-12-02 RX ORDER — ATORVASTATIN CALCIUM 20 MG/1
20 TABLET, FILM COATED ORAL DAILY
COMMUNITY
Start: 2021-11-20 | End: 2021-12-02 | Stop reason: ALTCHOICE

## 2021-12-03 LAB
ESTIMATED AVG GLUCOSE: 174 MG/DL (ref 68–131)
HBA1C MFR BLD: 7.7 % (ref 4–5.6)

## 2021-12-06 ENCOUNTER — TELEPHONE (OUTPATIENT)
Dept: INTERNAL MEDICINE | Facility: CLINIC | Age: 41
End: 2021-12-06
Payer: COMMERCIAL

## 2021-12-07 LAB
ALBUMIN/CREAT UR: ABNORMAL UG/MG (ref 0–30)
CREAT UR-MCNC: >450 MG/DL (ref 23–375)
MICROALBUMIN UR DL<=1MG/L-MCNC: 15 UG/ML

## 2021-12-18 PROBLEM — E11.29 TYPE 2 DIABETES MELLITUS WITH MICROALBUMINURIA, WITHOUT LONG-TERM CURRENT USE OF INSULIN: Chronic | Status: ACTIVE | Noted: 2021-12-18

## 2021-12-18 PROBLEM — R80.9 TYPE 2 DIABETES MELLITUS WITH MICROALBUMINURIA, WITHOUT LONG-TERM CURRENT USE OF INSULIN: Chronic | Status: ACTIVE | Noted: 2021-12-18

## 2022-01-02 ENCOUNTER — PATIENT OUTREACH (OUTPATIENT)
Dept: ADMINISTRATIVE | Facility: OTHER | Age: 42
End: 2022-01-02
Payer: COMMERCIAL

## 2022-01-03 ENCOUNTER — OFFICE VISIT (OUTPATIENT)
Dept: DERMATOLOGY | Facility: CLINIC | Age: 42
End: 2022-01-03
Payer: COMMERCIAL

## 2022-01-03 DIAGNOSIS — L81.9 DYSCHROMIA: ICD-10-CM

## 2022-01-03 DIAGNOSIS — L30.4 INTERTRIGO: ICD-10-CM

## 2022-01-03 DIAGNOSIS — L85.2 PUNCTATE PALMOPLANTAR KERATODERMA: Primary | ICD-10-CM

## 2022-01-03 PROCEDURE — 1159F MED LIST DOCD IN RCRD: CPT | Mod: CPTII,S$GLB,, | Performed by: STUDENT IN AN ORGANIZED HEALTH CARE EDUCATION/TRAINING PROGRAM

## 2022-01-03 PROCEDURE — 99999 PR PBB SHADOW E&M-EST. PATIENT-LVL III: ICD-10-PCS | Mod: PBBFAC,,, | Performed by: STUDENT IN AN ORGANIZED HEALTH CARE EDUCATION/TRAINING PROGRAM

## 2022-01-03 PROCEDURE — 99999 PR PBB SHADOW E&M-EST. PATIENT-LVL III: CPT | Mod: PBBFAC,,, | Performed by: STUDENT IN AN ORGANIZED HEALTH CARE EDUCATION/TRAINING PROGRAM

## 2022-01-03 PROCEDURE — 1160F RVW MEDS BY RX/DR IN RCRD: CPT | Mod: CPTII,S$GLB,, | Performed by: STUDENT IN AN ORGANIZED HEALTH CARE EDUCATION/TRAINING PROGRAM

## 2022-01-03 PROCEDURE — 99204 PR OFFICE/OUTPT VISIT, NEW, LEVL IV, 45-59 MIN: ICD-10-PCS | Mod: S$GLB,,, | Performed by: STUDENT IN AN ORGANIZED HEALTH CARE EDUCATION/TRAINING PROGRAM

## 2022-01-03 PROCEDURE — 1159F PR MEDICATION LIST DOCUMENTED IN MEDICAL RECORD: ICD-10-PCS | Mod: CPTII,S$GLB,, | Performed by: STUDENT IN AN ORGANIZED HEALTH CARE EDUCATION/TRAINING PROGRAM

## 2022-01-03 PROCEDURE — 99204 OFFICE O/P NEW MOD 45 MIN: CPT | Mod: S$GLB,,, | Performed by: STUDENT IN AN ORGANIZED HEALTH CARE EDUCATION/TRAINING PROGRAM

## 2022-01-03 PROCEDURE — 1160F PR REVIEW ALL MEDS BY PRESCRIBER/CLIN PHARMACIST DOCUMENTED: ICD-10-PCS | Mod: CPTII,S$GLB,, | Performed by: STUDENT IN AN ORGANIZED HEALTH CARE EDUCATION/TRAINING PROGRAM

## 2022-01-03 RX ORDER — KETOCONAZOLE 20 MG/G
CREAM TOPICAL 2 TIMES DAILY
Qty: 60 G | Refills: 5 | Status: SHIPPED | OUTPATIENT
Start: 2022-01-03 | End: 2022-10-11

## 2022-01-03 RX ORDER — HYDROCORTISONE 25 MG/G
CREAM TOPICAL 2 TIMES DAILY
Qty: 30 G | Refills: 5 | Status: SHIPPED | OUTPATIENT
Start: 2022-01-03 | End: 2022-10-11

## 2022-01-03 NOTE — PROGRESS NOTES
Patient Information  Name: Josesito Crabtree  : 1980  MRN: [id number]     Referring Physician:  Dr. Lenz   Primary Care Physician:  Dr. JR Monson MD   Date of Visit: 2022      Subjective:       Josesito Crabtree is a 41 y.o. male who presents for   Chief Complaint   Patient presents with    Eczema     Elbows and hands. Tx otc lotions.     HPI    Patient with new complaint of lesion(s)  Location: waist  Duration: 4 months  Symptoms: itches  Relieving factors/Previous treatments: Vaseline, OTC products     Patient with new complaint of lesion(s)  Location:  Hands, legs  Duration: years  Symptoms: none  Relieving factors/Previous treatments: vaseline    Patient was last seen:Visit date not found     Prior notes by myself reviewed.   Clinical documentation obtained by nursing staff reviewed.    Review of Systems   Skin: Positive for itching and rash.        Objective:    Physical Exam   Constitutional: He appears well-developed and well-nourished. No distress.   Neurological: He is alert and oriented to person, place, and time. He is not disoriented.   Psychiatric: He has a normal mood and affect.   Skin:   Areas Examined (abnormalities noted in diagram):   Abdomen Inspection Performed  Genitals / Buttocks / Groin Inspection Performed  RUE Inspected  LUE Inspection Performed  RLE Inspected  LLE Inspection Performed              Diagram Legend     Erythematous scaling macule/papule c/w actinic keratosis       Vascular papule c/w angioma      Pigmented verrucoid papule/plaque c/w seborrheic keratosis      Yellow umbilicated papule c/w sebaceous hyperplasia      Irregularly shaped tan macule c/w lentigo     1-2 mm smooth white papules consistent with Milia      Movable subcutaneous cyst with punctum c/w epidermal inclusion cyst      Subcutaneous movable cyst c/w pilar cyst      Firm pink to brown papule c/w dermatofibroma      Pedunculated fleshy papule(s) c/w skin tag(s)      Evenly pigmented macule c/w  junctional nevus     Mildly variegated pigmented, slightly irregular-bordered macule c/w mildly atypical nevus      Flesh colored to evenly pigmented papule c/w intradermal nevus       Pink pearly papule/plaque c/w basal cell carcinoma      Erythematous hyperkeratotic cursted plaque c/w SCC      Surgical scar with no sign of skin cancer recurrence      Open and closed comedones      Inflammatory papules and pustules      Verrucoid papule consistent consistent with wart     Erythematous eczematous patches and plaques     Dystrophic onycholytic nail with subungual debris c/w onychomycosis     Umbilicated papule    Erythematous-base heme-crusted tan verrucoid plaque consistent with inflamed seborrheic keratosis     Erythematous Silvery Scaling Plaque c/w Psoriasis     See annotation      No images are attached to the encounter or orders placed in the encounter.    [] Data reviewed  [] Independent review of test  [] Management discussed with another provider    Assessment / Plan:        Punctate palmoplantar keratoderma  - Reassurance    Intertrigo  -     Ambulatory referral/consult to Dermatology  -     ketoconazole (NIZORAL) 2 % cream; Apply topically 2 (two) times daily. Mix small amount with hydrocortisone cream and AAA on groin BID for up to 2 weeks at a time  Dispense: 60 g; Refill: 5  -     hydrocortisone 2.5 % cream; Apply topically 2 (two) times daily. Mix small amount with ketoconazole cream and AAA on groin BID for up to 2 weeks at a time  Dispense: 30 g; Refill: 5    Dyschromia 2/2 xerosis  - Recommend continuing to moisturize           LOS NUMBER AND COMPLEXITY OF PROBLEMS    COMPLEXITY OF DATA RISK TOTAL TIME (m)   56309  10442 [] 1 self-limited or minor problem [x] Minimal to none [] No treatment recommended or patient to monitor 15-29  10-19   90067  76388 Low  [] 2 or > self limited or minor problems  [] 1 stable chronic illness  [x] 1 acute, uncomplicated illness or injury Limited (2)  [] Prior  external notes from each unique source  [] Review result of each unique test  [] Order each unique test []  Low  OTC medications, minor skin biopsy 30-44  20-29   73898  95040 Moderate  []  1 or > chronic illness with progression, exacerbation or SE of treatment  [x]  2 or more stable chronic illnesses  []  1 acute illness with systemic symptoms  []  1 acute complicated injury  []  1 undiagnosed new problem with uncertain prognosis Moderate (1/3 below)  []  3 or more data items        *Now includes assessment requiring independent historian  []  Independent interpretation of a test  []  Discuss management/test with another provider Moderate  [x]  Prescription drug mgmt  []  Minor surgery with risk discussed  []  Mgmt limited by social determinates 45-59  30-39   34524  31664 High  []  1 or more chronic illness with severe exacerbation, progression or SE of treatment  []  1 acute or chronic illness/injury that poses a threat to life or bodily function Extensive (2/3 below)  []  3 or more data items        *Now includes assessment requiring independent historian.  []  Independent interpretation of a test  []  Discuss management/test with another provider High  []  Major surgery with risk discussed  []  Drug therapy requiring intensive monitoring for toxicity  []  Hospitalization  []  Decision for DNR 60-74  40-54      Follow up in about 6 months (around 7/3/2022).    Debbie Lilly MD, FAAD  [facility]

## 2022-01-06 ENCOUNTER — IMMUNIZATION (OUTPATIENT)
Dept: PRIMARY CARE CLINIC | Facility: CLINIC | Age: 42
End: 2022-01-06
Payer: COMMERCIAL

## 2022-01-06 DIAGNOSIS — Z23 NEED FOR VACCINATION: Primary | ICD-10-CM

## 2022-01-06 PROCEDURE — 0003A COVID-19, MRNA, LNP-S, PF, 30 MCG/0.3 ML DOSE VACCINE: CPT | Mod: CV19,PBBFAC | Performed by: FAMILY MEDICINE

## 2022-02-16 ENCOUNTER — LAB VISIT (OUTPATIENT)
Dept: LAB | Facility: HOSPITAL | Age: 42
End: 2022-02-16
Attending: FAMILY MEDICINE
Payer: COMMERCIAL

## 2022-02-16 DIAGNOSIS — I10 ESSENTIAL HYPERTENSION: ICD-10-CM

## 2022-02-16 DIAGNOSIS — E11.69 TYPE 2 DIABETES MELLITUS WITH OTHER SPECIFIED COMPLICATION, WITHOUT LONG-TERM CURRENT USE OF INSULIN: Chronic | ICD-10-CM

## 2022-02-16 LAB
ANION GAP SERPL CALC-SCNC: 10 MMOL/L (ref 8–16)
BUN SERPL-MCNC: 14 MG/DL (ref 6–20)
CALCIUM SERPL-MCNC: 9.7 MG/DL (ref 8.7–10.5)
CHLORIDE SERPL-SCNC: 105 MMOL/L (ref 95–110)
CO2 SERPL-SCNC: 27 MMOL/L (ref 23–29)
CREAT SERPL-MCNC: 1 MG/DL (ref 0.5–1.4)
EST. GFR  (AFRICAN AMERICAN): >60 ML/MIN/1.73 M^2
EST. GFR  (NON AFRICAN AMERICAN): >60 ML/MIN/1.73 M^2
ESTIMATED AVG GLUCOSE: 174 MG/DL (ref 68–131)
GLUCOSE SERPL-MCNC: 140 MG/DL (ref 70–110)
HBA1C MFR BLD: 7.7 % (ref 4–5.6)
POTASSIUM SERPL-SCNC: 4.6 MMOL/L (ref 3.5–5.1)
SODIUM SERPL-SCNC: 142 MMOL/L (ref 136–145)

## 2022-02-16 PROCEDURE — 80048 BASIC METABOLIC PNL TOTAL CA: CPT | Performed by: FAMILY MEDICINE

## 2022-02-16 PROCEDURE — 36415 COLL VENOUS BLD VENIPUNCTURE: CPT | Performed by: FAMILY MEDICINE

## 2022-02-16 PROCEDURE — 83036 HEMOGLOBIN GLYCOSYLATED A1C: CPT | Performed by: FAMILY MEDICINE

## 2022-02-23 ENCOUNTER — OFFICE VISIT (OUTPATIENT)
Dept: INTERNAL MEDICINE | Facility: CLINIC | Age: 42
End: 2022-02-23
Payer: COMMERCIAL

## 2022-02-23 ENCOUNTER — HOSPITAL ENCOUNTER (OUTPATIENT)
Dept: RADIOLOGY | Facility: HOSPITAL | Age: 42
Discharge: HOME | End: 2022-02-23
Attending: FAMILY MEDICINE
Payer: COMMERCIAL

## 2022-02-23 VITALS
SYSTOLIC BLOOD PRESSURE: 132 MMHG | DIASTOLIC BLOOD PRESSURE: 84 MMHG | OXYGEN SATURATION: 97 % | HEIGHT: 74 IN | BODY MASS INDEX: 40.43 KG/M2 | WEIGHT: 315 LBS | HEART RATE: 89 BPM

## 2022-02-23 DIAGNOSIS — G47.33 OBSTRUCTIVE SLEEP APNEA SYNDROME: ICD-10-CM

## 2022-02-23 DIAGNOSIS — G89.29 CHRONIC PAIN OF BOTH ANKLES: ICD-10-CM

## 2022-02-23 DIAGNOSIS — M17.0 PRIMARY LOCALIZED OSTEOARTHRITIS OF KNEES, BILATERAL: ICD-10-CM

## 2022-02-23 DIAGNOSIS — E11.69 DYSLIPIDEMIA ASSOCIATED WITH TYPE 2 DIABETES MELLITUS: Chronic | ICD-10-CM

## 2022-02-23 DIAGNOSIS — G89.29 CHRONIC PAIN OF BOTH KNEES: ICD-10-CM

## 2022-02-23 DIAGNOSIS — M25.562 CHRONIC PAIN OF BOTH KNEES: ICD-10-CM

## 2022-02-23 DIAGNOSIS — M19.071 PRIMARY LOCALIZED OSTEOARTHRITIS OF ANKLES, BILATERAL: Chronic | ICD-10-CM

## 2022-02-23 DIAGNOSIS — R80.9 TYPE 2 DIABETES MELLITUS WITH MICROALBUMINURIA, WITHOUT LONG-TERM CURRENT USE OF INSULIN: Chronic | ICD-10-CM

## 2022-02-23 DIAGNOSIS — I10 ESSENTIAL HYPERTENSION: Chronic | ICD-10-CM

## 2022-02-23 DIAGNOSIS — M25.571 CHRONIC PAIN OF BOTH ANKLES: ICD-10-CM

## 2022-02-23 DIAGNOSIS — Z00.01 ENCOUNTER FOR WELL ADULT EXAM WITH ABNORMAL FINDINGS: Primary | ICD-10-CM

## 2022-02-23 DIAGNOSIS — M25.561 CHRONIC PAIN OF BOTH KNEES: ICD-10-CM

## 2022-02-23 DIAGNOSIS — E11.29 TYPE 2 DIABETES MELLITUS WITH MICROALBUMINURIA, WITHOUT LONG-TERM CURRENT USE OF INSULIN: Chronic | ICD-10-CM

## 2022-02-23 DIAGNOSIS — M19.072 PRIMARY LOCALIZED OSTEOARTHRITIS OF ANKLES, BILATERAL: Chronic | ICD-10-CM

## 2022-02-23 DIAGNOSIS — E11.9 DIABETIC EYE EXAM: ICD-10-CM

## 2022-02-23 DIAGNOSIS — M25.572 CHRONIC PAIN OF BOTH ANKLES: ICD-10-CM

## 2022-02-23 DIAGNOSIS — Z01.00 DIABETIC EYE EXAM: ICD-10-CM

## 2022-02-23 DIAGNOSIS — E66.01 MORBID OBESITY WITH BMI OF 45.0-49.9, ADULT: Chronic | ICD-10-CM

## 2022-02-23 DIAGNOSIS — Z23 NEED FOR 23-POLYVALENT PNEUMOCOCCAL POLYSACCHARIDE VACCINE: ICD-10-CM

## 2022-02-23 DIAGNOSIS — E78.5 DYSLIPIDEMIA ASSOCIATED WITH TYPE 2 DIABETES MELLITUS: Chronic | ICD-10-CM

## 2022-02-23 DIAGNOSIS — F17.210 CIGARETTE NICOTINE DEPENDENCE, UNCOMPLICATED: ICD-10-CM

## 2022-02-23 PROBLEM — I87.303 CHRONIC VENOUS HYPERTENSION INVOLVING BOTH SIDES: Chronic | Status: ACTIVE | Noted: 2020-10-05

## 2022-02-23 PROBLEM — L83 ACANTHOSIS NIGRICANS: Chronic | Status: ACTIVE | Noted: 2020-10-05

## 2022-02-23 PROCEDURE — 73610 X-RAY EXAM OF ANKLE: CPT | Mod: TC,50

## 2022-02-23 PROCEDURE — 1159F PR MEDICATION LIST DOCUMENTED IN MEDICAL RECORD: ICD-10-PCS | Mod: CPTII,S$GLB,, | Performed by: FAMILY MEDICINE

## 2022-02-23 PROCEDURE — 73562 X-RAY EXAM OF KNEE 3: CPT | Mod: 26,,, | Performed by: RADIOLOGY

## 2022-02-23 PROCEDURE — 3008F BODY MASS INDEX DOCD: CPT | Mod: CPTII,S$GLB,, | Performed by: FAMILY MEDICINE

## 2022-02-23 PROCEDURE — 3079F DIAST BP 80-89 MM HG: CPT | Mod: CPTII,S$GLB,, | Performed by: FAMILY MEDICINE

## 2022-02-23 PROCEDURE — 3075F PR MOST RECENT SYSTOLIC BLOOD PRESS GE 130-139MM HG: ICD-10-PCS | Mod: CPTII,S$GLB,, | Performed by: FAMILY MEDICINE

## 2022-02-23 PROCEDURE — 73562 XR KNEE ORTHO BILAT: ICD-10-PCS | Mod: 26,,, | Performed by: RADIOLOGY

## 2022-02-23 PROCEDURE — 73610 X-RAY EXAM OF ANKLE: CPT | Mod: 26,,, | Performed by: RADIOLOGY

## 2022-02-23 PROCEDURE — 99396 PR PREVENTIVE VISIT,EST,40-64: ICD-10-PCS | Mod: S$GLB,,, | Performed by: FAMILY MEDICINE

## 2022-02-23 PROCEDURE — 3079F PR MOST RECENT DIASTOLIC BLOOD PRESSURE 80-89 MM HG: ICD-10-PCS | Mod: CPTII,S$GLB,, | Performed by: FAMILY MEDICINE

## 2022-02-23 PROCEDURE — 73562 X-RAY EXAM OF KNEE 3: CPT | Mod: TC,50

## 2022-02-23 PROCEDURE — 3051F HG A1C>EQUAL 7.0%<8.0%: CPT | Mod: CPTII,S$GLB,, | Performed by: FAMILY MEDICINE

## 2022-02-23 PROCEDURE — 1160F RVW MEDS BY RX/DR IN RCRD: CPT | Mod: CPTII,S$GLB,, | Performed by: FAMILY MEDICINE

## 2022-02-23 PROCEDURE — 3051F PR MOST RECENT HEMOGLOBIN A1C LEVEL 7.0 - < 8.0%: ICD-10-PCS | Mod: CPTII,S$GLB,, | Performed by: FAMILY MEDICINE

## 2022-02-23 PROCEDURE — 99396 PREV VISIT EST AGE 40-64: CPT | Mod: S$GLB,,, | Performed by: FAMILY MEDICINE

## 2022-02-23 PROCEDURE — 99999 PR PBB SHADOW E&M-EST. PATIENT-LVL V: CPT | Mod: PBBFAC,,, | Performed by: FAMILY MEDICINE

## 2022-02-23 PROCEDURE — 73610 XR ANKLE COMPLETE 3 VIEW BILATERAL: ICD-10-PCS | Mod: 26,,, | Performed by: RADIOLOGY

## 2022-02-23 PROCEDURE — 3008F PR BODY MASS INDEX (BMI) DOCUMENTED: ICD-10-PCS | Mod: CPTII,S$GLB,, | Performed by: FAMILY MEDICINE

## 2022-02-23 PROCEDURE — 1159F MED LIST DOCD IN RCRD: CPT | Mod: CPTII,S$GLB,, | Performed by: FAMILY MEDICINE

## 2022-02-23 PROCEDURE — 3075F SYST BP GE 130 - 139MM HG: CPT | Mod: CPTII,S$GLB,, | Performed by: FAMILY MEDICINE

## 2022-02-23 PROCEDURE — 1160F PR REVIEW ALL MEDS BY PRESCRIBER/CLIN PHARMACIST DOCUMENTED: ICD-10-PCS | Mod: CPTII,S$GLB,, | Performed by: FAMILY MEDICINE

## 2022-02-23 PROCEDURE — 99999 PR PBB SHADOW E&M-EST. PATIENT-LVL V: ICD-10-PCS | Mod: PBBFAC,,, | Performed by: FAMILY MEDICINE

## 2022-02-23 RX ORDER — DICLOFENAC SODIUM 30 MG/G
GEL TOPICAL
Qty: 200 G | Refills: 5 | Status: SHIPPED | OUTPATIENT
Start: 2022-02-23 | End: 2022-10-11

## 2022-02-23 NOTE — ASSESSMENT & PLAN NOTE
Lab Results   Component Value Date    CHOL 96 (L) 12/02/2021    CHOL 160 06/08/2021    TRIG 96 12/02/2021    TRIG 88 06/08/2021    HDL 30 (L) 12/02/2021    HDL 41 06/08/2021    LDLCALC 46.8 (L) 12/02/2021    LDLCALC 101.4 06/08/2021    NONHDLCHOL 66 12/02/2021    NONHDLCHOL 119 06/08/2021    AST 29 06/08/2021    ALT 53 (H) 06/08/2021     The ASCVD Risk score (Camdenallison NGUYEN Jr., et al., 2013) failed to calculate for the following reasons:    The valid total cholesterol range is 130 to 320 mg/dL

## 2022-02-23 NOTE — PROGRESS NOTES
"WELLNESS VISIT (PREVENTIVE SERVICES)  2/23/22  9:30 AM CST  LAST ENCOUNTER WITH ME:  12/2/2021   HEALTH MAINTENANCE INTERVENTIONS - UP TO DATE  Health Maintenance Topics with due status: Not Due       Topic Last Completion Date    TETANUS VACCINE 01/01/2017    Diabetes Urine Screening 12/02/2021    Foot Exam 12/02/2021    Lipid Panel 12/02/2021    COVID-19 Vaccine 01/06/2022    Hemoglobin A1c 02/16/2022    Low Dose Statin 02/23/2022       HEALTH MAINTENANCE INTERVENTIONS - DUE OR DUE SOON  Health Maintenance Due   Topic Date Due    Pneumococcal Vaccines (Age 0-64) (1 of 2 - PPSV23) Never done    Eye Exam  12/22/2021       CHIEF COMPLAINT: Annual Wellness Visit (Preventive Services)    DM stable. OA worse. Home BP readings discrepant with in-office readings. I will D/W Hypertension Digital Medicine program team.    DIAGNOSES SPECIFICALLY EVALUATED AND TREATED THIS ENCOUNTER  1. Encounter for well adult exam with abnormal findings    2. Type 2 diabetes mellitus with microalbuminuria, without long-term current use of insulin    3. Essential hypertension    4. Cigarette nicotine dependence, uncomplicated    5. Obstructive sleep apnea syndrome    6. Morbid obesity with BMI of 45.0-49.9, adult    7. Dyslipidemia associated with type 2 diabetes mellitus     No follow-ups on file.    Problem List Items Addressed This Visit     Morbid obesity with BMI of 45.0-49.9, adult (Chronic)    Current Assessment & Plan     Wt Readings from Last 6 Encounters:   02/23/22 (!) 172.1 kg (379 lb 6.6 oz)   12/02/21 (!) 173.2 kg (381 lb 13.4 oz)   08/13/21 (!) 175.1 kg (386 lb)   07/09/21 (!) 175.5 kg (386 lb 14.5 oz)   06/08/21 (!) 177.2 kg (390 lb 10.5 oz)   04/20/21 (!) 176.7 kg (389 lb 8.9 oz)     Estimated body mass index is 48.71 kg/m² as calculated from the following:    Height as of this encounter: 6' 2" (1.88 m).    Weight as of this encounter: 172.1 kg (379 lb 6.6 oz).             Essential hypertension (Chronic)    Current " Assessment & Plan     BP Readings from Last 6 Encounters:   02/23/22 132/74   12/02/21 120/70   07/09/21 122/70   06/22/21 132/80   06/08/21 (!) 146/96   04/20/21 (!) 140/82      Last 5 Patient Entered Readings                Current 30 Day Average: 155/98  Recent Readings 2/15/2022 2/5/2022 2/1/2022 1/31/2022 1/31/2022   SBP (mmHg) 163 156 153 144 154   DBP (mmHg) 99 99 97 99 100   Pulse 71 66 75 72 72                          Dyslipidemia associated with type 2 diabetes mellitus (Chronic)    Current Assessment & Plan     Lab Results   Component Value Date    CHOL 96 (L) 12/02/2021    CHOL 160 06/08/2021    TRIG 96 12/02/2021    TRIG 88 06/08/2021    HDL 30 (L) 12/02/2021    HDL 41 06/08/2021    LDLCALC 46.8 (L) 12/02/2021    LDLCALC 101.4 06/08/2021    NONHDLCHOL 66 12/02/2021    NONHDLCHOL 119 06/08/2021    AST 29 06/08/2021    ALT 53 (H) 06/08/2021     The ASCVD Risk score (Gt NGUYEN Jr., et al., 2013) failed to calculate for the following reasons:    The valid total cholesterol range is 130 to 320 mg/dL            Type 2 diabetes mellitus with microalbuminuria, without long-term current use of insulin (Chronic)    Current Assessment & Plan     Diabetes Management Status    Statin: Taking  ACE/ARB: Taking    Screening or Prevention Patient's value Goal Complete/Controlled?   HgA1C Testing and Control   Lab Results   Component Value Date    HGBA1C 7.7 (H) 02/16/2022      Annually/Less than 8% Yes   Lipid profile : 12/02/2021 Annually Yes   LDL control Lab Results   Component Value Date    LDLCALC 46.8 (L) 12/02/2021    Annually/Less than 100 mg/dl  Yes   Nephropathy screening Lab Results   Component Value Date    LABMICR 15.0 12/02/2021     No results found for: PROTEINUA Annually Yes   Blood pressure BP Readings from Last 1 Encounters:   02/23/22 132/74    Less than 140/90 Yes   Dilated retinal exam : 12/22/2020 Annually Yes   Foot exam   : 12/02/2021 Annually Yes     Lab Results   Component Value Date    HGBA1C  7.7 (H) 02/16/2022    HGBA1C 7.7 (H) 12/02/2021    HGBA1C 9.2 (H) 10/01/2021    ESTGFRAFRICA >60.0 02/16/2022    EGFRNONAA >60.0 02/16/2022    MICALBCREAT Unable to calculate 12/02/2021    LDLCALC 46.8 (L) 12/02/2021     Last 6 Patient Entered Readings                                          Most Recent A1c: 7.7% on 2/16/2022  (Goal: 7%)     Recent Readings 2/15/2022 2/1/2022 1/31/2022 1/30/2022 1/26/2022    Blood Glucose (mg/dL) 138 139 134 161 150         HEALTH MAINTENANCE: Diabetic health maintenance interventions reviewed and are up to date except for:      Topic    Eye Exam                Cigarette nicotine dependence, uncomplicated    Obstructive sleep apnea syndrome    Overview     11/10/2020 Home Sleep Test AHI = 21             Other Visit Diagnoses     Encounter for well adult exam with abnormal findings    -  Primary      Unless noted herein, any chronic conditions are represented as and appear compensated/controlled and stable, and no other significant complaints or concerns were reported.    PRESCRIPTION DRUG MANAGEMENT  Outpatient Medications Prior to Visit   Medication Sig Dispense Refill    blood sugar diagnostic Strp Check blood glucose 1 times daily (BEFORE breakfast or other meal) and as needed for symptoms of low or high blood glucose 100 each 11    blood-glucose meter kit Check blood glucose 1 times daily (BEFORE breakfast or other meal) and as needed for symptoms of low or high blood glucose 1 each 0    hydrocortisone 2.5 % cream Apply topically 2 (two) times daily. Mix small amount with ketoconazole cream and AAA on groin BID for up to 2 weeks at a time 30 g 5    ketoconazole (NIZORAL) 2 % cream Apply topically 2 (two) times daily. Mix small amount with hydrocortisone cream and AAA on groin BID for up to 2 weeks at a time 60 g 5    lancets Misc Check blood glucose 1 times daily (BEFORE breakfast or other meal) and as needed for symptoms of low or high blood glucose 100 each 11     "metFORMIN (GLUCOPHAGE) 500 MG tablet Take 1 tablet (500 mg total) by mouth 2 (two) times daily with meals. 180 tablet 1    multivitamin (THERAGRAN) per tablet Take 1 tablet by mouth once daily.      ONETOUCH DELICA PLUS LANCET 30 gauge Misc       ONETOUCH ULTRA2 METER Misc SMARTSI Each Via Meter As Directed      rosuvastatin (CRESTOR) 20 MG tablet Take 1 tablet (20 mg total) by mouth once daily. 90 tablet 0    valsartan-hydrochlorothiazide (DIOVAN-HCT) 160-12.5 mg per tablet Take 1 tablet by mouth once daily. 30 tablet 0     No facility-administered medications prior to visit.   There are no discontinued medications.     PHYSICAL EXAM  Vitals:    22 0904   BP: 132/74   BP Location: Left arm   Patient Position: Sitting   BP Method: X-Large (Manual)   Pulse: 89   SpO2: 97%   Weight: (!) 172.1 kg (379 lb 6.6 oz)   Height: 6' 2" (1.88 m)   CONSTITUTIONAL: Vital signs noted. No apparent distress. Does not appear acutely ill or septic. Appears adequately hydrated.  PULM: Lungs clear. Breathing unlabored.  HEART: Regular.  DERM: Skin warm and moist with normal turgor.  NEURO: There are no gross focal motor deficits.  PSYCHIATRIC: Alert and conversant. Mood grossly neutral. Judgment and insight grossly intact.      PAST MEDICAL HISTORY  Josesito has a past medical history of Essential hypertension, Type 2 diabetes mellitus with microalbuminuria, without long-term current use of insulin, and Type 2 diabetes mellitus with other specified complication.    SURGICAL HISTORY  Josesito has a past surgical history that includes Knee surgery (Left, ).    FAMILY HISTORY  Josesito family history includes No Known Problems in his father and mother.     ALLERGIES  Review of patient's allergies indicates:  No Known Allergies    SOCIAL HISTORY  Josestio  reports that he has been smoking cigarettes. He has been smoking about 0.50 packs per day. He has never used smokeless tobacco. He reports current alcohol use. He reports " "that he does not use drugs.     Documentation entered by me for this encounter may have been done in part using speech-recognition technology. Although I have made an effort to ensure accuracy, "sound like" errors may exist and should be interpreted in context.   "

## 2022-02-23 NOTE — ASSESSMENT & PLAN NOTE
Diabetes Management Status    Statin: Taking  ACE/ARB: Taking    Screening or Prevention Patient's value Goal Complete/Controlled?   HgA1C Testing and Control   Lab Results   Component Value Date    HGBA1C 7.7 (H) 02/16/2022      Annually/Less than 8% Yes   Lipid profile : 12/02/2021 Annually Yes   LDL control Lab Results   Component Value Date    LDLCALC 46.8 (L) 12/02/2021    Annually/Less than 100 mg/dl  Yes   Nephropathy screening Lab Results   Component Value Date    LABMICR 15.0 12/02/2021     No results found for: PROTEINUA Annually Yes   Blood pressure BP Readings from Last 1 Encounters:   02/23/22 132/74    Less than 140/90 Yes   Dilated retinal exam : 12/22/2020 Annually Yes   Foot exam   : 12/02/2021 Annually Yes     Lab Results   Component Value Date    HGBA1C 7.7 (H) 02/16/2022    HGBA1C 7.7 (H) 12/02/2021    HGBA1C 9.2 (H) 10/01/2021    ESTGFRAFRICA >60.0 02/16/2022    EGFRNONAA >60.0 02/16/2022    MICALBCREAT Unable to calculate 12/02/2021    LDLCALC 46.8 (L) 12/02/2021     Last 6 Patient Entered Readings                                          Most Recent A1c: 7.7% on 2/16/2022  (Goal: 7%)     Recent Readings 2/15/2022 2/1/2022 1/31/2022 1/30/2022 1/26/2022    Blood Glucose (mg/dL) 138 139 134 161 150         HEALTH MAINTENANCE: Diabetic health maintenance interventions reviewed and are up to date except for:      Topic    Eye Exam

## 2022-02-23 NOTE — ASSESSMENT & PLAN NOTE
"Wt Readings from Last 6 Encounters:   02/23/22 (!) 172.1 kg (379 lb 6.6 oz)   12/02/21 (!) 173.2 kg (381 lb 13.4 oz)   08/13/21 (!) 175.1 kg (386 lb)   07/09/21 (!) 175.5 kg (386 lb 14.5 oz)   06/08/21 (!) 177.2 kg (390 lb 10.5 oz)   04/20/21 (!) 176.7 kg (389 lb 8.9 oz)     Estimated body mass index is 48.71 kg/m² as calculated from the following:    Height as of this encounter: 6' 2" (1.88 m).    Weight as of this encounter: 172.1 kg (379 lb 6.6 oz).    "

## 2022-02-23 NOTE — ASSESSMENT & PLAN NOTE
BP Readings from Last 6 Encounters:   02/23/22 132/74   12/02/21 120/70   07/09/21 122/70   06/22/21 132/80   06/08/21 (!) 146/96   04/20/21 (!) 140/82      Last 5 Patient Entered Readings                Current 30 Day Average: 155/98  Recent Readings 2/15/2022 2/5/2022 2/1/2022 1/31/2022 1/31/2022   SBP (mmHg) 163 156 153 144 154   DBP (mmHg) 99 99 97 99 100   Pulse 71 66 75 72 72

## 2022-02-23 NOTE — Clinical Note
TO DIGITAL MEDICINE TEAM:  I need your help with a couple of things.  (1) His home BP readings are consistently discrepant with in-office readings, which show good BP control. His home monitor must have a calibration check and either be fixed or replaced; else, he must un-enroll in Hypertension Digital Medicine program.  (2) Please clarify for him dose of his metformin. (Current Rx shows 500 BID, but he thought he was supposed to be on 100 BID.)  Thank you for your help caring for our patients!  -LM

## 2022-02-24 ENCOUNTER — TELEPHONE (OUTPATIENT)
Dept: ORTHOPEDICS | Facility: CLINIC | Age: 42
End: 2022-02-24
Payer: COMMERCIAL

## 2022-03-02 NOTE — PROGRESS NOTES
Josesito Hamilton.    I see notes in  your chart where the orthopedic department has tried to call you to schedule your appointment.    You can schedule your orthopedic appointment by calling our appointment desk at [phone]. If you have any difficulty, send my staff a message, and they will be glad to help.     Thanks for letting me care for you, and thanks for trusting [facility] with your healthcare needs.    All the best,    ARUN Monson MD

## 2022-03-02 NOTE — PROGRESS NOTES
Josesito Hamilton.    I see notes in  your chart where the orthopedic department has tried to call you to schedule your appointment.    You can schedule your orthopedic appointment by calling our appointment desk at [phone]. If you have any difficulty, send my staff a message, and they will be glad to help.     Thanks for letting me care for you, and thanks for trusting [facility] with your healthcare needs.    All the best,    ARUN Monson MD

## 2022-03-03 DIAGNOSIS — M25.561 PAIN IN BOTH KNEES, UNSPECIFIED CHRONICITY: Primary | ICD-10-CM

## 2022-03-03 DIAGNOSIS — M25.562 PAIN IN BOTH KNEES, UNSPECIFIED CHRONICITY: Primary | ICD-10-CM

## 2022-03-08 ENCOUNTER — PATIENT MESSAGE (OUTPATIENT)
Dept: OTHER | Facility: OTHER | Age: 42
End: 2022-03-08
Payer: COMMERCIAL

## 2022-03-09 ENCOUNTER — PATIENT MESSAGE (OUTPATIENT)
Dept: SMOKING CESSATION | Facility: CLINIC | Age: 42
End: 2022-03-09
Payer: COMMERCIAL

## 2022-04-07 ENCOUNTER — OFFICE VISIT (OUTPATIENT)
Dept: ORTHOPEDICS | Facility: CLINIC | Age: 42
End: 2022-04-07
Payer: COMMERCIAL

## 2022-04-07 ENCOUNTER — TELEPHONE (OUTPATIENT)
Dept: INTERNAL MEDICINE | Facility: CLINIC | Age: 42
End: 2022-04-07
Payer: COMMERCIAL

## 2022-04-07 ENCOUNTER — PATIENT OUTREACH (OUTPATIENT)
Dept: ADMINISTRATIVE | Facility: OTHER | Age: 42
End: 2022-04-07
Payer: COMMERCIAL

## 2022-04-07 ENCOUNTER — HOSPITAL ENCOUNTER (OUTPATIENT)
Dept: RADIOLOGY | Facility: HOSPITAL | Age: 42
Discharge: HOME | End: 2022-04-07
Attending: FAMILY MEDICINE
Payer: COMMERCIAL

## 2022-04-07 VITALS — WEIGHT: 315 LBS | BODY MASS INDEX: 40.43 KG/M2 | HEIGHT: 74 IN

## 2022-04-07 DIAGNOSIS — M25.562 PAIN IN BOTH KNEES, UNSPECIFIED CHRONICITY: ICD-10-CM

## 2022-04-07 DIAGNOSIS — E11.65 TYPE 2 DIABETES MELLITUS WITH HYPERGLYCEMIA, WITHOUT LONG-TERM CURRENT USE OF INSULIN: ICD-10-CM

## 2022-04-07 DIAGNOSIS — G89.29 CHRONIC PAIN OF BOTH KNEES: ICD-10-CM

## 2022-04-07 DIAGNOSIS — E66.01 MORBID OBESITY WITH BMI OF 45.0-49.9, ADULT: Primary | ICD-10-CM

## 2022-04-07 DIAGNOSIS — M19.071 PRIMARY LOCALIZED OSTEOARTHRITIS OF ANKLES, BILATERAL: Chronic | ICD-10-CM

## 2022-04-07 DIAGNOSIS — M25.562 CHRONIC PAIN OF BOTH KNEES: ICD-10-CM

## 2022-04-07 DIAGNOSIS — M25.561 CHRONIC PAIN OF BOTH KNEES: ICD-10-CM

## 2022-04-07 DIAGNOSIS — M19.072 PRIMARY LOCALIZED OSTEOARTHRITIS OF ANKLES, BILATERAL: Chronic | ICD-10-CM

## 2022-04-07 DIAGNOSIS — M17.0 PRIMARY LOCALIZED OSTEOARTHRITIS OF KNEES, BILATERAL: ICD-10-CM

## 2022-04-07 DIAGNOSIS — M25.561 PAIN IN BOTH KNEES, UNSPECIFIED CHRONICITY: ICD-10-CM

## 2022-04-07 PROCEDURE — 99204 OFFICE O/P NEW MOD 45 MIN: CPT | Mod: S$GLB,,, | Performed by: ORTHOPAEDIC SURGERY

## 2022-04-07 PROCEDURE — 73564 XR KNEE ORTHO BILAT WITH FLEXION: ICD-10-PCS | Mod: 26,,, | Performed by: RADIOLOGY

## 2022-04-07 PROCEDURE — 1159F PR MEDICATION LIST DOCUMENTED IN MEDICAL RECORD: ICD-10-PCS | Mod: CPTII,S$GLB,, | Performed by: ORTHOPAEDIC SURGERY

## 2022-04-07 PROCEDURE — 73564 X-RAY EXAM KNEE 4 OR MORE: CPT | Mod: TC,50

## 2022-04-07 PROCEDURE — 3051F HG A1C>EQUAL 7.0%<8.0%: CPT | Mod: CPTII,S$GLB,, | Performed by: ORTHOPAEDIC SURGERY

## 2022-04-07 PROCEDURE — 3008F BODY MASS INDEX DOCD: CPT | Mod: CPTII,S$GLB,, | Performed by: ORTHOPAEDIC SURGERY

## 2022-04-07 PROCEDURE — 3008F PR BODY MASS INDEX (BMI) DOCUMENTED: ICD-10-PCS | Mod: CPTII,S$GLB,, | Performed by: ORTHOPAEDIC SURGERY

## 2022-04-07 PROCEDURE — 73564 X-RAY EXAM KNEE 4 OR MORE: CPT | Mod: 26,,, | Performed by: RADIOLOGY

## 2022-04-07 PROCEDURE — 99204 PR OFFICE/OUTPT VISIT, NEW, LEVL IV, 45-59 MIN: ICD-10-PCS | Mod: S$GLB,,, | Performed by: ORTHOPAEDIC SURGERY

## 2022-04-07 PROCEDURE — 99999 PR PBB SHADOW E&M-EST. PATIENT-LVL III: CPT | Mod: PBBFAC,,, | Performed by: ORTHOPAEDIC SURGERY

## 2022-04-07 PROCEDURE — 99999 PR PBB SHADOW E&M-EST. PATIENT-LVL III: ICD-10-PCS | Mod: PBBFAC,,, | Performed by: ORTHOPAEDIC SURGERY

## 2022-04-07 PROCEDURE — 3051F PR MOST RECENT HEMOGLOBIN A1C LEVEL 7.0 - < 8.0%: ICD-10-PCS | Mod: CPTII,S$GLB,, | Performed by: ORTHOPAEDIC SURGERY

## 2022-04-07 PROCEDURE — 1159F MED LIST DOCD IN RCRD: CPT | Mod: CPTII,S$GLB,, | Performed by: ORTHOPAEDIC SURGERY

## 2022-04-07 NOTE — TELEPHONE ENCOUNTER
----- Message from Dennis Shrestha sent at 4/7/2022 10:05 AM CDT -----  Regarding: PCP consult prediabetes  Dr. Monson    This patient saw Dr. Stone today for knee OA, we would like to consider a cortisone injection but the patient would like to discuss with you first regarding his blood sugar to make sure it's safe.  He is scheduled for May but we were wondering if you could work him in sooner.    Thank you!

## 2022-04-07 NOTE — PROGRESS NOTES
Patient ID: Josesito Crabtree  YOB: 1980  MRN: [id number]    Chief Complaint: Pain, Numbness, and Swelling of the Left Knee and Pain and Swelling of the Right Knee    Referred By: PCP Dr. 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  He has not had any recent treatment but uses OTC NSAID's and Voltaren.  He had knee surgery in 1996 in [place] but doesn't remember much detail.     Past Medical History:   Past Medical History:   Diagnosis Date    Essential hypertension 10/5/2020    Primary localized osteoarthritis of knees, bilateral 2/23/2022    Type 2 diabetes mellitus with microalbuminuria, without long-term current use of insulin 12/18/2021    Type 2 diabetes mellitus with other specified complication 10/19/2020     Past Surgical History:   Procedure Laterality Date    KNEE SURGERY Left 1996/1997     Family History   Problem Relation Age of Onset    No Known Problems Mother     No Known Problems Father      Social History     Socioeconomic History    Marital status:    Tobacco Use    Smoking status: Current Every Day Smoker     Packs/day: 0.50     Types: Cigarettes    Smokeless tobacco: Never Used   Substance and Sexual Activity    Alcohol use: Yes    Drug use: Never    Sexual activity: Yes     Partners: Female     Medication List with Changes/Refills   Current Medications    BLOOD SUGAR DIAGNOSTIC STRP    Check blood glucose 1 times daily (BEFORE breakfast or other meal) and as needed for symptoms of low or high blood glucose    BLOOD-GLUCOSE METER KIT    Check blood glucose 1 times daily (BEFORE breakfast or other meal) and as needed for symptoms of low or high blood glucose    DICLOFENAC SODIUM  (SOLARAZE) 3 % GEL    Apply small amount (2 g) to painful joints 3 times daily as needed.    HYDROCORTISONE 2.5 % CREAM    Apply topically 2 (two) times daily. Mix small amount with ketoconazole cream and AAA on groin BID for up to 2 weeks at a time    KETOCONAZOLE (NIZORAL) 2 % CREAM    Apply topically 2 (two) times daily. Mix small amount with hydrocortisone cream and AAA on groin BID for up to 2 weeks at a time    LANCETS MISC    Check blood glucose 1 times daily (BEFORE breakfast or other meal) and as needed for symptoms of low or high blood glucose    METFORMIN (GLUCOPHAGE) 500 MG TABLET    Take 1 tablet (500 mg total) by mouth 2 (two) times daily with meals.    MULTIVITAMIN (THERAGRAN) PER TABLET    Take 1 tablet by mouth once daily.    ONETOUCH DELICA PLUS LANCET 30 GAUGE [facility]        ONETOUCH ULTRA2 METER MISC    SMARTSI Each Via Meter As Directed    ROSUVASTATIN (CRESTOR) 20 MG TABLET    Take 1 tablet (20 mg total) by mouth once daily.    VALSARTAN-HYDROCHLOROTHIAZIDE (DIOVAN-HCT) 160-12.5 MG PER TABLET    Take 1 tablet by mouth once daily.     Review of patient's allergies indicates:  No Known Allergies    Physical Exam:   Body mass index is 48.66 kg/m².  There were no vitals filed for this visit.   GENERAL: Well appearing, appropriate for stated age, no acute distress.  CARDIOVASCULAR: Pulses regular by peripheral palpation.  PULMONARY: Respirations are even and non-labored.  NEURO: Awake, alert, and oriented x 3.  PSYCH: Mood & affect are appropriate.  HEENT: Head is normocephalic and atraumatic.    Right Knee:    Inspection: No significant effusion or deformity    Palpation tenderness: Medial joint line, Lateral joint line     Patellar tendon, trochlea    Range of motion: -5 deg extension - 120 deg flexion    Strength:  5/5 Extension    5/5 Flexion    5/5 Hip Abduction     Stability: Stable ACL/Lachman      Stable Posterior Drawer      Stable MCL/Valgus Stress      Stable LCL/Varus Stress     Patella  Exam: Negative J-sign   Negative Patellar apprehension   Positive Patellar crepitus    N/V Exam:  Tibial:    Normal sensory (plantar foot)  Normal motor (FHL)    Sup Peroneal:   Normal sensory (dorsal foot)  Normal motor (Peroneals)            Deep Peroneal:   Normal sensory (1st web space)  Normal motor (EHL)    Sural:   Normal sensory (lateral foot)   Saphenous:   Normal sensory (medial lower leg)   Normal pedal pulses, warm and well perfused with capillary refill < 2 sec       Left Knee:    Inspection: No significant effusion or deformity    Palpation tenderness: Medial joint line, Lateral joint line     Patellar tendon, trochlea    Range of motion: -5 deg extension - 100 deg flexion    Strength:  5/5 Extension    5/5 Flexion    5/5 Hip Abduction     Stability: Stable ACL/Lachman      Stable Posterior Drawer      Stable MCL/Valgus Stress      Stable LCL/Varus Stress     Patella Exam: Negative J-sign   Negative Patellar apprehension   Positive Patellar crepitus    N/V Exam:  Tibial:    Normal sensory (plantar foot)  Normal motor (FHL)    Sup Peroneal:   Normal sensory (dorsal foot)  Normal motor (Peroneals)            Deep Peroneal:   Normal sensory (1st web space)  Normal motor (EHL)    Sural:   Normal sensory (lateral foot)   Saphenous:   Normal sensory (medial lower leg)   Normal pedal pulses, warm and well perfused with capillary refill < 2 sec     Imaging:    X-ray Knee Ortho Bilateral with Flexion  Narrative: EXAMINATION:  XR KNEE ORTHO BILAT WITH FLEXION    CLINICAL HISTORY:  Pain in right knee    TECHNIQUE:  AP standing of both knees, PA flexion standing views of both knees, and Merchant views of both knees were performed.  Lateral views of both knees were also performed.    COMPARISON:  02/23/2022    FINDINGS:  Stable tricompartmental degenerative changes of the left knee.    Mild tricompartmental degenerative changes of the right knee are also unchanged compared to the recent radiographs.    No acute  fracture or dislocation.  Soft tissues are within normal limits.  No left knee joint effusion is seen.  Small right knee joint effusion is present.    No change since 02/23/2022  Impression: As above    Electronically signed by: Haroon Montes De Oca MD  Date:    04/07/2022  Time:    09:23    Relevant imaging results reviewed and interpreted by me, discussed with the patient and / or family today. I agree with findings stated above.       Patient Instructions     Assessment:  Josesito Crabtree is a 41 y.o. 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    Primary localized osteoarthritis of knees, bilateral     Primary localized osteoarthritis of ankles, bilateral     Chronic pain of both knees     Morbid obesity with BMI of 45.0-49.9, adult Yes    Type 2 diabetes mellitus with hyperglycemia, without long-term current use of insulin       Plan:   We recommend conservative treatment of osteoarthritis with cortisone injection and physical therapy   He would like to consult with his PCP regarding his blood sugar before the injection   He will return for injection at a later date - will call us after he sees his PCP.  We will reach out to see if they can expedite an appointment    Although there is not a cure for arthritis, there are effective ways to improve symptoms.    I recommend low impact activities such as elliptical and bicycle    Aquatic and pool therapy is often helpful because it lessens the impact on the joint, strengthens the leg and thigh muscles, and helps to control swelling.    If walking long distances, I recommend good quality well-cushioned shoes.    Knee motion is important to the health of the knee.    A compression knee sleeve can help limit  swelling and provide proprioceptive feedback.    I do recommend formal physical therapy or at minimum a home exercise program.    Some over the counter solutions such as glucosamine and chondroitin may help with symptoms, although the evidence is mixed.   Excess body weight can have a negative impact on joint health and on pain. I recommend healthy lifestyle choices including nutrition and exercise that help reach and maintain an ideal body weight.   Some diets cause increased inflammation. I recommend a balanced wholesome diet including some foods such as olives that are shown to decrease inflammation.        Follow-up: 3 months after injection with non-operative providers or sooner if there are any problems between now and then.    Thank you for choosing [facility] and Dr. Moises Stone for your orthopedic & sports medicine care. It is our goal to provide you with exceptional care that will help keep you healthy, active, and get you back in the game.    If you felt that you received exemplary care today, please consider leaving us feedback on [employer] at [email or web address].     Please do not hesitate to reach out to us via email, phone, or MyChart with any questions, concerns, or feedback.    If you are experiencing pain/discomfort ,or have questions after 5pm and would like to be connected to the [facility] on-call team, please call this number and specify which Sports Medicine provider is treating you: [phone]        Provider Note/Medical Decision Making:      I discussed worrisome and red flag signs and symptoms with the patient. The patient expressed understanding and agreed to alert me immediately or to go to the emergency room if they experience any of these.    Treatment plan was developed with input from the patient/family, and they expressed understanding and agreement with the plan.  All questions were answered today.    Disclaimer: This note was prepared using a voice recognition system and is likely to have sound alike errors within the text.     I, Dennis Shrestha, acted as a scribe for Moises Stone MD for the duration of this office visit.

## 2022-04-07 NOTE — PATIENT INSTRUCTIONS
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    Primary localized osteoarthritis of knees, bilateral     Primary localized osteoarthritis of ankles, bilateral     Chronic pain of both knees     Morbid obesity with BMI of 45.0-49.9, adult Yes    Type 2 diabetes mellitus with hyperglycemia, without long-term current use of insulin       Plan:  We recommend conservative treatment of osteoarthritis with cortisone injection and physical therapy  He would like to consult with his PCP regarding his blood sugar before the injection  He will return for injection at a later date - will call us after he sees his PCP.  We will reach out to see if they can expedite an appointment    Although there is not a cure for arthritis, there are effective ways to improve symptoms.   I recommend low impact activities such as elliptical and bicycle   Aquatic and pool therapy is often helpful because it lessens the impact on the joint, strengthens the leg and thigh muscles, and helps to control swelling.   If walking long distances, I recommend good quality well-cushioned shoes.   Knee motion is important to the health of the knee.   A compression knee sleeve can help limit swelling and provide proprioceptive feedback.   I do recommend formal physical therapy or at minimum a home exercise program.   Some over the counter solutions such as glucosamine and chondroitin may help with symptoms, although the evidence is mixed.  Excess body weight can have a negative impact on joint health and on pain. I recommend healthy lifestyle choices including nutrition and exercise that help reach and maintain an ideal body weight.  Some diets cause increased  inflammation. I recommend a balanced wholesome diet including some foods such as olives that are shown to decrease inflammation.        Follow-up: 3 months after injection with non-operative providers or sooner if there are any problems between now and then.    Thank you for choosing [facility] and Dr. Moises Stone for your orthopedic & sports medicine care. It is our goal to provide you with exceptional care that will help keep you healthy, active, and get you back in the game.    If you felt that you received exemplary care today, please consider leaving us feedback on [employer] at [email or web address].     Please do not hesitate to reach out to us via email, phone, or MyChart with any questions, concerns, or feedback.    If you are experiencing pain/discomfort ,or have questions after 5pm and would like to be connected to the [facility] on-call team, please call this number and specify which Sports Medicine provider is treating you: [phone]

## 2022-04-26 ENCOUNTER — PATIENT MESSAGE (OUTPATIENT)
Dept: ADMINISTRATIVE | Facility: HOSPITAL | Age: 42
End: 2022-04-26
Payer: COMMERCIAL

## 2022-05-16 ENCOUNTER — LAB VISIT (OUTPATIENT)
Dept: LAB | Facility: HOSPITAL | Age: 42
End: 2022-05-16
Attending: FAMILY MEDICINE
Payer: COMMERCIAL

## 2022-05-16 DIAGNOSIS — E11.69 TYPE 2 DIABETES MELLITUS WITH OTHER SPECIFIED COMPLICATION, WITHOUT LONG-TERM CURRENT USE OF INSULIN: Chronic | ICD-10-CM

## 2022-05-16 LAB
ESTIMATED AVG GLUCOSE: 212 MG/DL (ref 68–131)
HBA1C MFR BLD: 9 % (ref 4–5.6)

## 2022-05-16 PROCEDURE — 36415 COLL VENOUS BLD VENIPUNCTURE: CPT | Performed by: FAMILY MEDICINE

## 2022-05-16 PROCEDURE — 83036 HEMOGLOBIN GLYCOSYLATED A1C: CPT | Performed by: FAMILY MEDICINE

## 2022-06-05 NOTE — PROGRESS NOTES
Results to be addressed at upcoming appointment(s) already scheduled.  Future Appointments  7/7/2022   9:00 AM    Darell Roberts MD        [facility]        [place]  7/27/2022  9:30 AM    ARUN Monson MD      [facility] IM             [facility]  10/3/2022  11:00 AM   Elizabeth Lejeune, NP      [facility]        [facility]

## 2022-07-01 ENCOUNTER — TELEPHONE (OUTPATIENT)
Dept: SPORTS MEDICINE | Facility: CLINIC | Age: 42
End: 2022-07-01
Payer: COMMERCIAL

## 2022-07-01 NOTE — TELEPHONE ENCOUNTER
LVM for patient to contact our office about having to reschedule appointment for July 7 to July 6 due to provider being out of office on July 7.  Asked that patient please contact office to be rescheduled.

## 2022-07-05 ENCOUNTER — TELEPHONE (OUTPATIENT)
Dept: SPORTS MEDICINE | Facility: CLINIC | Age: 42
End: 2022-07-05
Payer: COMMERCIAL

## 2022-07-05 ENCOUNTER — PATIENT MESSAGE (OUTPATIENT)
Dept: ORTHOPEDICS | Facility: CLINIC | Age: 42
End: 2022-07-05
Payer: COMMERCIAL

## 2022-07-05 NOTE — TELEPHONE ENCOUNTER
Contacted pt to discuss the need of rescheduling 7/7 appt due to provider being out of office. Was unable to reach patient, multiple voicemail have been left trying to reach this patient.

## 2022-07-27 ENCOUNTER — OFFICE VISIT (OUTPATIENT)
Dept: INTERNAL MEDICINE | Facility: CLINIC | Age: 42
End: 2022-07-27
Payer: COMMERCIAL

## 2022-07-27 VITALS
BODY MASS INDEX: 40.43 KG/M2 | HEIGHT: 74 IN | TEMPERATURE: 98 F | OXYGEN SATURATION: 96 % | WEIGHT: 315 LBS | DIASTOLIC BLOOD PRESSURE: 70 MMHG | SYSTOLIC BLOOD PRESSURE: 130 MMHG | HEART RATE: 81 BPM

## 2022-07-27 DIAGNOSIS — E78.5 DYSLIPIDEMIA ASSOCIATED WITH TYPE 2 DIABETES MELLITUS: Chronic | ICD-10-CM

## 2022-07-27 DIAGNOSIS — E66.01 MORBID OBESITY WITH BMI OF 45.0-49.9, ADULT: Chronic | ICD-10-CM

## 2022-07-27 DIAGNOSIS — E11.69 DYSLIPIDEMIA ASSOCIATED WITH TYPE 2 DIABETES MELLITUS: Chronic | ICD-10-CM

## 2022-07-27 DIAGNOSIS — G47.33 OBSTRUCTIVE SLEEP APNEA SYNDROME: ICD-10-CM

## 2022-07-27 DIAGNOSIS — E11.9 DIABETIC EYE EXAM: ICD-10-CM

## 2022-07-27 DIAGNOSIS — I10 ESSENTIAL HYPERTENSION: Chronic | ICD-10-CM

## 2022-07-27 DIAGNOSIS — E11.65 TYPE 2 DIABETES MELLITUS WITH HYPERGLYCEMIA, WITHOUT LONG-TERM CURRENT USE OF INSULIN: Primary | Chronic | ICD-10-CM

## 2022-07-27 DIAGNOSIS — Z01.00 DIABETIC EYE EXAM: ICD-10-CM

## 2022-07-27 PROCEDURE — 3078F PR MOST RECENT DIASTOLIC BLOOD PRESSURE < 80 MM HG: ICD-10-PCS | Mod: CPTII,S$GLB,, | Performed by: FAMILY MEDICINE

## 2022-07-27 PROCEDURE — 99999 PR PBB SHADOW E&M-EST. PATIENT-LVL V: ICD-10-PCS | Mod: PBBFAC,,, | Performed by: FAMILY MEDICINE

## 2022-07-27 PROCEDURE — 3008F BODY MASS INDEX DOCD: CPT | Mod: CPTII,S$GLB,, | Performed by: FAMILY MEDICINE

## 2022-07-27 PROCEDURE — 99999 PR PBB SHADOW E&M-EST. PATIENT-LVL V: CPT | Mod: PBBFAC,,, | Performed by: FAMILY MEDICINE

## 2022-07-27 PROCEDURE — 3075F PR MOST RECENT SYSTOLIC BLOOD PRESS GE 130-139MM HG: ICD-10-PCS | Mod: CPTII,S$GLB,, | Performed by: FAMILY MEDICINE

## 2022-07-27 PROCEDURE — 3008F PR BODY MASS INDEX (BMI) DOCUMENTED: ICD-10-PCS | Mod: CPTII,S$GLB,, | Performed by: FAMILY MEDICINE

## 2022-07-27 PROCEDURE — 99214 PR OFFICE/OUTPT VISIT, EST, LEVL IV, 30-39 MIN: ICD-10-PCS | Mod: S$GLB,,, | Performed by: FAMILY MEDICINE

## 2022-07-27 PROCEDURE — 3052F HG A1C>EQUAL 8.0%<EQUAL 9.0%: CPT | Mod: CPTII,S$GLB,, | Performed by: FAMILY MEDICINE

## 2022-07-27 PROCEDURE — 3075F SYST BP GE 130 - 139MM HG: CPT | Mod: CPTII,S$GLB,, | Performed by: FAMILY MEDICINE

## 2022-07-27 PROCEDURE — 3052F PR MOST RECENT HEMOGLOBIN A1C LEVEL 8.0 - < 9.0%: ICD-10-PCS | Mod: CPTII,S$GLB,, | Performed by: FAMILY MEDICINE

## 2022-07-27 PROCEDURE — 3078F DIAST BP <80 MM HG: CPT | Mod: CPTII,S$GLB,, | Performed by: FAMILY MEDICINE

## 2022-07-27 PROCEDURE — 99214 OFFICE O/P EST MOD 30 MIN: CPT | Mod: S$GLB,,, | Performed by: FAMILY MEDICINE

## 2022-07-27 RX ORDER — DAPAGLIFLOZIN 5 MG/1
5 TABLET, FILM COATED ORAL DAILY
Qty: 30 TABLET | Refills: 1 | Status: SHIPPED | OUTPATIENT
Start: 2022-07-27 | End: 2022-10-11

## 2022-07-27 NOTE — ASSESSMENT & PLAN NOTE
Diabetes Management Status    Statin: Taking  ACE/ARB: Not taking    Screening or Prevention Patient's value Goal Complete/Controlled?   HgA1C Testing and Control   Lab Results   Component Value Date    HGBA1C 9.0 (H) 05/16/2022      Annually/Less than 8% No   Lipid profile : 12/02/2021 Annually Yes   LDL control Lab Results   Component Value Date    LDLCALC 46.8 (L) 12/02/2021    Annually/Less than 100 mg/dl  Yes   Nephropathy screening Lab Results   Component Value Date    LABMICR 15.0 12/02/2021     No results found for: PROTEINUA Annually Yes   Blood pressure BP Readings from Last 1 Encounters:   07/27/22 130/70    Less than 140/90 Yes   Dilated retinal exam : 12/22/2020 Annually Yes   Foot exam   : 12/02/2021 Annually Yes     Lab Results   Component Value Date    HGBA1C 9.0 (H) 05/16/2022    HGBA1C 7.7 (H) 02/16/2022    HGBA1C 7.7 (H) 12/02/2021    ESTGFRAFRICA >60.0 02/16/2022    EGFRNONAA >60.0 02/16/2022    MICALBCREAT Unable to calculate 12/02/2021    LDLCALC 46.8 (L) 12/02/2021     No results found for: GLUTAMICACID, CPEPTIDE   Last 6 Patient Entered Readings                                          Most Recent A1c: 9% on 5/16/2022  (Goal: 7%)     Recent Readings 6/26/2022 6/25/2022 5/13/2022 5/6/2022 4/28/2022    Blood Glucose (mg/dL) 115 114 166 192 165         HEALTH MAINTENANCE: Diabetic health maintenance interventions reviewed and are up to date except for:      Topic    Eye Exam

## 2022-07-27 NOTE — Clinical Note
I started him on Farxiga and referred him to [facility]. This is just FYI only. No action required.  Thank you for your help caring for our patients!  -SANTOSH

## 2022-07-27 NOTE — ASSESSMENT & PLAN NOTE
"Wt Readings from Last 6 Encounters:   07/27/22 (!) 171.6 kg (378 lb 5 oz)   04/07/22 (!) 171.9 kg (379 lb)   02/23/22 (!) 172.1 kg (379 lb 6.6 oz)   12/02/21 (!) 173.2 kg (381 lb 13.4 oz)   08/13/21 (!) 175.1 kg (386 lb)   07/09/21 (!) 175.5 kg (386 lb 14.5 oz)     Estimated body mass index is 48.57 kg/m² as calculated from the following:    Height as of this encounter: 6' 2" (1.88 m).    Weight as of this encounter: 171.6 kg (378 lb 5 oz).    "

## 2022-07-27 NOTE — ASSESSMENT & PLAN NOTE
BP Readings from Last 6 Encounters:   07/27/22 130/70   02/23/22 132/84   12/02/21 120/70   07/09/21 122/70   06/22/21 132/80   06/08/21 (!) 146/96      Last 5 Patient Entered Readings                Current 30 Day Average:   Recent Readings 6/25/2022 5/13/2022 4/28/2022 2/15/2022 2/5/2022   SBP (mmHg) 155 132 130 163 156   DBP (mmHg) 110 89 97 99 99   Pulse 73 87 82 71 66                 Lab Results   Component Value Date    ESTGFRAFRICA >60.0 02/16/2022    EGFRNONAA >60.0 02/16/2022    CREATININE 1.0 02/16/2022    BUN 14 02/16/2022    K 4.6 02/16/2022     02/16/2022     02/16/2022     Results for orders placed or performed during the hospital encounter of 10/07/20   SCHEDULED EKG 12-LEAD (to Muse)    Collection Time: 10/07/20  9:56 AM    Narrative    Test Reason : I10    Vent. Rate : 084 BPM     Atrial Rate : 084 BPM     P-R Int : 160 ms          QRS Dur : 090 ms      QT Int : 396 ms       P-R-T Axes : 064 044 068 degrees     QTc Int : 467 ms    Normal sinus rhythm  Nonspecific T wave abnormality  Abnormal ECG  No previous ECGs available  Confirmed by SARA VIERA MD ([id number]) on 10/8/2020 12:56:54 AM    Referred By: ARUN AGUIAR           Confirmed By:SARA VIERA MD

## 2022-07-27 NOTE — PROGRESS NOTES
OFFICE VISIT 7/27/22  9:30 AM CDT    CHIEF COMPLAINT: Follow-up    DIAGNOSES SPECIFICALLY EVALUATED AND TREATED THIS ENCOUNTER  1. Type 2 diabetes mellitus with hyperglycemia, without long-term current use of insulin  - dapagliflozin (FARXIGA) 5 mg Tab tablet; Take 1 tablet (5 mg total) by mouth once daily.  Dispense: 30 tablet; Refill: 1  - Ambulatory referral/consult to Diabetic Advanced Practice Providers (Medical Management); Future    2. Dyslipidemia associated with type 2 diabetes mellitus    3. Essential hypertension    4. Morbid obesity with BMI of 45.0-49.9, adult  - Ambulatory referral/consult to Bariatric Surgery; Future    5. Obstructive sleep apnea syndrome    6. Diabetic eye exam  - Ambulatory referral/consult to Ophthalmology; Future     Follow up in about 4 months (around 11/27/2022) for review test results, discuss treatment plan, re-evaluate problem(s) discussed today.     Problem List Items Addressed This Visit       Dyslipidemia associated with type 2 diabetes mellitus (Chronic)    Current Assessment & Plan     Lab Results   Component Value Date    CHOL 96 (L) 12/02/2021    CHOL 160 06/08/2021    TRIG 96 12/02/2021    TRIG 88 06/08/2021    HDL 30 (L) 12/02/2021    HDL 41 06/08/2021    LDLCALC 46.8 (L) 12/02/2021    LDLCALC 101.4 06/08/2021    NONHDLCHOL 66 12/02/2021    NONHDLCHOL 119 06/08/2021    AST 29 06/08/2021    ALT 53 (H) 06/08/2021   The ASCVD Risk score (Gt NGUYEN Jr., et al., 2013) failed to calculate for the following reasons:    The valid total cholesterol range is 130 to 320 mg/dL          Essential hypertension (Chronic)    Current Assessment & Plan     BP Readings from Last 6 Encounters:   07/27/22 130/70   02/23/22 132/84   12/02/21 120/70   07/09/21 122/70   06/22/21 132/80   06/08/21 (!) 146/96      Last 5 Patient Entered Readings                Current 30 Day Average:   Recent Readings 6/25/2022 5/13/2022 4/28/2022 2/15/2022 2/5/2022   SBP (mmHg) 155 132 130 163 156   DBP (mmHg)  "110 89 97 99 99   Pulse 73 87 82 71 66                   Lab Results   Component Value Date    ESTGFRAFRICA >60.0 02/16/2022    EGFRNONAA >60.0 02/16/2022    CREATININE 1.0 02/16/2022    BUN 14 02/16/2022    K 4.6 02/16/2022     02/16/2022     02/16/2022     Results for orders placed or performed during the hospital encounter of 10/07/20   SCHEDULED EKG 12-LEAD (to Muse)    Collection Time: 10/07/20  9:56 AM    Narrative    Test Reason : I10    Vent. Rate : 084 BPM     Atrial Rate : 084 BPM     P-R Int : 160 ms          QRS Dur : 090 ms      QT Int : 396 ms       P-R-T Axes : 064 044 068 degrees     QTc Int : 467 ms    Normal sinus rhythm  Nonspecific T wave abnormality  Abnormal ECG  No previous ECGs available  Confirmed by SARA VIERA MD ([id number]) on 10/8/2020 12:56:54 AM    Referred By: ARUN AGUIAR           Confirmed By:SARA VIERA MD              Morbid obesity with BMI of 45.0-49.9, adult (Chronic)    Current Assessment & Plan     Wt Readings from Last 6 Encounters:   07/27/22 (!) 171.6 kg (378 lb 5 oz)   04/07/22 (!) 171.9 kg (379 lb)   02/23/22 (!) 172.1 kg (379 lb 6.6 oz)   12/02/21 (!) 173.2 kg (381 lb 13.4 oz)   08/13/21 (!) 175.1 kg (386 lb)   07/09/21 (!) 175.5 kg (386 lb 14.5 oz)   Estimated body mass index is 48.57 kg/m² as calculated from the following:    Height as of this encounter: 6' 2" (1.88 m).    Weight as of this encounter: 171.6 kg (378 lb 5 oz).           Relevant Orders    Ambulatory referral/consult to Bariatric Surgery    Type 2 diabetes mellitus with hyperglycemia, without long-term current use of insulin - Primary (Chronic)    Current Assessment & Plan     Diabetes Management Status    Statin: Taking  ACE/ARB: Not taking    Screening or Prevention Patient's value Goal Complete/Controlled?   HgA1C Testing and Control   Lab Results   Component Value Date    HGBA1C 9.0 (H) 05/16/2022      Annually/Less than 8% No   Lipid profile : 12/02/2021 Annually Yes   LDL control Lab " Results   Component Value Date    LDLCALC 46.8 (L) 12/02/2021    Annually/Less than 100 mg/dl  Yes   Nephropathy screening Lab Results   Component Value Date    LABMICR 15.0 12/02/2021     No results found for: PROTEINUA Annually Yes   Blood pressure BP Readings from Last 1 Encounters:   07/27/22 130/70    Less than 140/90 Yes   Dilated retinal exam : 12/22/2020 Annually Yes   Foot exam   : 12/02/2021 Annually Yes     Lab Results   Component Value Date    HGBA1C 9.0 (H) 05/16/2022    HGBA1C 7.7 (H) 02/16/2022    HGBA1C 7.7 (H) 12/02/2021    ESTGFRAFRICA >60.0 02/16/2022    EGFRNONAA >60.0 02/16/2022    MICALBCREAT Unable to calculate 12/02/2021    LDLCALC 46.8 (L) 12/02/2021   No results found for: GLUTAMICACID, CPEPTIDE   Last 6 Patient Entered Readings                                          Most Recent A1c: 9% on 5/16/2022  (Goal: 7%)       Recent Readings 6/26/2022 6/25/2022 5/13/2022 5/6/2022 4/28/2022    Blood Glucose (mg/dL) 115 114 166 192 165      HEALTH MAINTENANCE: Diabetic health maintenance interventions reviewed and are up to date except for:      Topic    Eye Exam              Relevant Medications    dapagliflozin (FARXIGA) 5 mg Tab tablet    Other Relevant Orders    Ambulatory referral/consult to Diabetic Advanced Practice Providers (Medical Management)    Obstructive sleep apnea syndrome    Overview     11/10/2020 Home Sleep Test AHI = 21          Other Visit Diagnoses       Diabetic eye exam        Relevant Orders    Ambulatory referral/consult to Ophthalmology        Unless noted herein, any chronic conditions are represented as and appear compensated/controlled and stable, and no other significant complaints or concerns were reported.    PRESCRIPTION DRUG MANAGEMENT  Outpatient Medications Prior to Visit   Medication Sig Dispense Refill    blood sugar diagnostic Strp Check blood glucose 1 times daily (BEFORE breakfast or other meal) and as needed for symptoms of low or high blood glucose 100  "each 11    blood-glucose meter kit Check blood glucose 1 times daily (BEFORE breakfast or other meal) and as needed for symptoms of low or high blood glucose 1 each 0    diclofenac sodium (SOLARAZE) 3 % gel Apply small amount (2 g) to painful joints 3 times daily as needed. 200 g 5    hydrocortisone 2.5 % cream Apply topically 2 (two) times daily. Mix small amount with ketoconazole cream and AAA on groin BID for up to 2 weeks at a time 30 g 5    ketoconazole (NIZORAL) 2 % cream Apply topically 2 (two) times daily. Mix small amount with hydrocortisone cream and AAA on groin BID for up to 2 weeks at a time 60 g 5    lancets Misc Check blood glucose 1 times daily (BEFORE breakfast or other meal) and as needed for symptoms of low or high blood glucose 100 each 11    multivitamin (THERAGRAN) per tablet Take 1 tablet by mouth once daily.      ONETOUCH DELICA PLUS LANCET 30 gauge Misc       ONETOUCH ULTRA2 METER Misc SMARTSI Each Via Meter As Directed      metFORMIN (GLUCOPHAGE) 1000 MG tablet Take 1 tablet (1,000 mg total) by mouth 2 (two) times daily with meals. 180 tablet 1    rosuvastatin (CRESTOR) 20 MG tablet Take 1 tablet (20 mg total) by mouth once daily. 90 tablet 0    valsartan-hydrochlorothiazide (DIOVAN-HCT) 160-12.5 mg per tablet Take 1 tablet by mouth once daily. 30 tablet 5     No facility-administered medications prior to visit.   There are no discontinued medications.  Medications Ordered This Encounter   Medications    dapagliflozin (FARXIGA) 5 mg Tab tablet     Sig: Take 1 tablet (5 mg total) by mouth once daily.     Dispense:  30 tablet     Refill:  1     PHYSICAL EXAM  Vitals:    22 0929   BP: 130/70   BP Location: Right arm   Patient Position: Sitting   BP Method: X-Large (Manual)   Pulse: 81   Temp: 97.7 °F (36.5 °C)   TempSrc: Tympanic   SpO2: 96%   Weight: (!) 171.6 kg (378 lb 5 oz)   Height: 6' 2" (1.88 m)   CONSTITUTIONAL: Vital signs noted. No apparent distress. Does not appear acutely " "ill or septic. Appears adequately hydrated.  PULM: Breathing unlabored.  HEART: Regular.  PSYCHIATRIC: Alert and conversant. Grossly oriented. Mood is grossly neutral. Affect appropriate. Judgment and insight grossly intact.     Documentation entered by me for this encounter may have been done in part using speech-recognition technology. Although I have made an effort to ensure accuracy, "sound like" errors may exist and should be interpreted in context.   "

## 2022-07-27 NOTE — ASSESSMENT & PLAN NOTE
Lab Results   Component Value Date    CHOL 96 (L) 12/02/2021    CHOL 160 06/08/2021    TRIG 96 12/02/2021    TRIG 88 06/08/2021    HDL 30 (L) 12/02/2021    HDL 41 06/08/2021    LDLCALC 46.8 (L) 12/02/2021    LDLCALC 101.4 06/08/2021    NONHDLCHOL 66 12/02/2021    NONHDLCHOL 119 06/08/2021    AST 29 06/08/2021    ALT 53 (H) 06/08/2021     The ASCVD Risk score ([place] NGUYEN Jr., et al., 2013) failed to calculate for the following reasons:    The valid total cholesterol range is 130 to 320 mg/dL

## 2022-07-28 ENCOUNTER — TELEPHONE (OUTPATIENT)
Dept: DIABETES | Facility: CLINIC | Age: 42
End: 2022-07-28
Payer: COMMERCIAL

## 2022-08-16 ENCOUNTER — TELEPHONE (OUTPATIENT)
Dept: DIABETES | Facility: CLINIC | Age: 42
End: 2022-08-16
Payer: COMMERCIAL

## 2022-10-11 ENCOUNTER — OFFICE VISIT (OUTPATIENT)
Dept: INTERNAL MEDICINE | Facility: CLINIC | Age: 42
End: 2022-10-11
Payer: COMMERCIAL

## 2022-10-11 ENCOUNTER — HOSPITAL ENCOUNTER (OUTPATIENT)
Dept: RADIOLOGY | Facility: HOSPITAL | Age: 42
Discharge: HOME | End: 2022-10-11
Attending: FAMILY MEDICINE
Payer: COMMERCIAL

## 2022-10-11 ENCOUNTER — LAB VISIT (OUTPATIENT)
Dept: LAB | Facility: HOSPITAL | Age: 42
End: 2022-10-11
Attending: FAMILY MEDICINE
Payer: COMMERCIAL

## 2022-10-11 VITALS
OXYGEN SATURATION: 98 % | BODY MASS INDEX: 48.03 KG/M2 | WEIGHT: 315 LBS | HEART RATE: 90 BPM | SYSTOLIC BLOOD PRESSURE: 126 MMHG | DIASTOLIC BLOOD PRESSURE: 96 MMHG | TEMPERATURE: 98 F

## 2022-10-11 DIAGNOSIS — I10 ESSENTIAL HYPERTENSION: Chronic | ICD-10-CM

## 2022-10-11 DIAGNOSIS — B35.1 ONYCHOMYCOSIS OF MULTIPLE TOENAILS WITH TYPE 2 DIABETES MELLITUS: Chronic | ICD-10-CM

## 2022-10-11 DIAGNOSIS — J11.1 FLU SYNDROME: ICD-10-CM

## 2022-10-11 DIAGNOSIS — E11.69 ONYCHOMYCOSIS OF MULTIPLE TOENAILS WITH TYPE 2 DIABETES MELLITUS: Chronic | ICD-10-CM

## 2022-10-11 DIAGNOSIS — G47.33 OBSTRUCTIVE SLEEP APNEA: Chronic | ICD-10-CM

## 2022-10-11 DIAGNOSIS — R05.9 COUGH, UNSPECIFIED TYPE: ICD-10-CM

## 2022-10-11 DIAGNOSIS — E11.65 TYPE 2 DIABETES MELLITUS WITH HYPERGLYCEMIA, WITHOUT LONG-TERM CURRENT USE OF INSULIN: Chronic | ICD-10-CM

## 2022-10-11 DIAGNOSIS — E78.5 DYSLIPIDEMIA ASSOCIATED WITH TYPE 2 DIABETES MELLITUS: Chronic | ICD-10-CM

## 2022-10-11 DIAGNOSIS — E11.9 ENCOUNTER FOR DIABETIC FOOT EXAM: ICD-10-CM

## 2022-10-11 DIAGNOSIS — J11.1 FLU SYNDROME: Primary | ICD-10-CM

## 2022-10-11 DIAGNOSIS — R53.83 FATIGUE, UNSPECIFIED TYPE: ICD-10-CM

## 2022-10-11 DIAGNOSIS — E66.01 MORBID OBESITY WITH BMI OF 45.0-49.9, ADULT: Chronic | ICD-10-CM

## 2022-10-11 DIAGNOSIS — E11.69 DYSLIPIDEMIA ASSOCIATED WITH TYPE 2 DIABETES MELLITUS: Chronic | ICD-10-CM

## 2022-10-11 LAB
ALBUMIN/CREAT UR: 9.6 UG/MG (ref 0–30)
CREAT UR-MCNC: 136 MG/DL (ref 23–375)
CTP QC/QA: YES
MICROALBUMIN UR DL<=1MG/L-MCNC: 13 UG/ML
POC MOLECULAR INFLUENZA A AGN: NEGATIVE
POC MOLECULAR INFLUENZA B AGN: NEGATIVE

## 2022-10-11 PROCEDURE — 99214 PR OFFICE/OUTPT VISIT, EST, LEVL IV, 30-39 MIN: ICD-10-PCS | Mod: S$GLB,,, | Performed by: FAMILY MEDICINE

## 2022-10-11 PROCEDURE — 99999 PR PBB SHADOW E&M-EST. PATIENT-LVL IV: ICD-10-PCS | Mod: PBBFAC,,, | Performed by: FAMILY MEDICINE

## 2022-10-11 PROCEDURE — 3008F BODY MASS INDEX DOCD: CPT | Mod: CPTII,S$GLB,, | Performed by: FAMILY MEDICINE

## 2022-10-11 PROCEDURE — 3074F PR MOST RECENT SYSTOLIC BLOOD PRESSURE < 130 MM HG: ICD-10-PCS | Mod: CPTII,S$GLB,, | Performed by: FAMILY MEDICINE

## 2022-10-11 PROCEDURE — 1160F PR REVIEW ALL MEDS BY PRESCRIBER/CLIN PHARMACIST DOCUMENTED: ICD-10-PCS | Mod: CPTII,S$GLB,, | Performed by: FAMILY MEDICINE

## 2022-10-11 PROCEDURE — 1159F PR MEDICATION LIST DOCUMENTED IN MEDICAL RECORD: ICD-10-PCS | Mod: CPTII,S$GLB,, | Performed by: FAMILY MEDICINE

## 2022-10-11 PROCEDURE — 1159F MED LIST DOCD IN RCRD: CPT | Mod: CPTII,S$GLB,, | Performed by: FAMILY MEDICINE

## 2022-10-11 PROCEDURE — 3052F PR MOST RECENT HEMOGLOBIN A1C LEVEL 8.0 - < 9.0%: ICD-10-PCS | Mod: CPTII,S$GLB,, | Performed by: FAMILY MEDICINE

## 2022-10-11 PROCEDURE — 71046 XR CHEST PA AND LATERAL: ICD-10-PCS | Mod: 26,,, | Performed by: RADIOLOGY

## 2022-10-11 PROCEDURE — 1160F RVW MEDS BY RX/DR IN RCRD: CPT | Mod: CPTII,S$GLB,, | Performed by: FAMILY MEDICINE

## 2022-10-11 PROCEDURE — 3074F SYST BP LT 130 MM HG: CPT | Mod: CPTII,S$GLB,, | Performed by: FAMILY MEDICINE

## 2022-10-11 PROCEDURE — 82570 ASSAY OF URINE CREATININE: CPT | Performed by: FAMILY MEDICINE

## 2022-10-11 PROCEDURE — 71046 X-RAY EXAM CHEST 2 VIEWS: CPT | Mod: 26,,, | Performed by: RADIOLOGY

## 2022-10-11 PROCEDURE — 3008F PR BODY MASS INDEX (BMI) DOCUMENTED: ICD-10-PCS | Mod: CPTII,S$GLB,, | Performed by: FAMILY MEDICINE

## 2022-10-11 PROCEDURE — 82043 UR ALBUMIN QUANTITATIVE: CPT | Performed by: FAMILY MEDICINE

## 2022-10-11 PROCEDURE — 99999 PR PBB SHADOW E&M-EST. PATIENT-LVL IV: CPT | Mod: PBBFAC,,, | Performed by: FAMILY MEDICINE

## 2022-10-11 PROCEDURE — 3052F HG A1C>EQUAL 8.0%<EQUAL 9.0%: CPT | Mod: CPTII,S$GLB,, | Performed by: FAMILY MEDICINE

## 2022-10-11 PROCEDURE — 3080F DIAST BP >= 90 MM HG: CPT | Mod: CPTII,S$GLB,, | Performed by: FAMILY MEDICINE

## 2022-10-11 PROCEDURE — 87502 POCT INFLUENZA A/B MOLECULAR: ICD-10-PCS | Mod: QW,S$GLB,, | Performed by: FAMILY MEDICINE

## 2022-10-11 PROCEDURE — 71046 X-RAY EXAM CHEST 2 VIEWS: CPT | Mod: TC

## 2022-10-11 PROCEDURE — 3080F PR MOST RECENT DIASTOLIC BLOOD PRESSURE >= 90 MM HG: ICD-10-PCS | Mod: CPTII,S$GLB,, | Performed by: FAMILY MEDICINE

## 2022-10-11 PROCEDURE — 99214 OFFICE O/P EST MOD 30 MIN: CPT | Mod: S$GLB,,, | Performed by: FAMILY MEDICINE

## 2022-10-11 PROCEDURE — 87502 INFLUENZA DNA AMP PROBE: CPT | Mod: QW,S$GLB,, | Performed by: FAMILY MEDICINE

## 2022-10-11 RX ORDER — ATORVASTATIN CALCIUM 20 MG/1
20 TABLET, FILM COATED ORAL NIGHTLY
Qty: 90 TABLET | Refills: 3 | Status: SHIPPED | OUTPATIENT
Start: 2022-10-11 | End: 2023-08-31 | Stop reason: SDUPTHER

## 2022-10-11 RX ORDER — BENZONATATE 200 MG/1
200 CAPSULE ORAL 3 TIMES DAILY PRN
Qty: 30 CAPSULE | Refills: 0 | Status: SHIPPED | OUTPATIENT
Start: 2022-10-11 | End: 2022-10-21

## 2022-10-11 NOTE — PROGRESS NOTES
"OFFICE VISIT 10/11/22  8:00 AM CDT    CHIEF COMPLAINT: Influenza (flu-like symptoms)    He reports Sunday he started developing flu symptoms characterized as cough productive of clear gray sputum, fatigue, malaise, myalgias. He reports chills but no fever. He says that he tested for COVID twice, once at home and once at work, and they were both negative. No chest pain, hemoptysis, or shortness of breath reported. We discussed differential diagnosis and risks and benefits of treatment options. It was agreed to proceed with evaluation as ordered and symptomatic management for now.      Office Visit on 10/11/2022   Component Date Value Ref Range Status    POC Molecular Influenza A Ag 10/11/2022 Negative  Negative, Not Reported Final    POC Molecular Influenza B Ag 10/11/2022 Negative  Negative, Not Reported Final     Acceptable 10/11/2022 Yes   Final     From [facility] on 10/5/22: "Your patient Mr. Josesito Crabtree is enrolled in the HTN/DM [facility] program. Unfortunately, we have been unable to maintain contact with him to continue monitoring, despite our best efforts." He agreed to enroll today.    Stopped taking Farxiga inadvertently. No Side-effects. Will defer new Rx until diabetes clinic appointment 10/13/22.    Stopped rosuvastatin due to SE of "sour taste in my mouth." He tolerated atorvastatin well.    DIAGNOSES SPECIFICALLY EVALUATED AND TREATED THIS ENCOUNTER  1. Flu syndrome  - POCT Influenza A/B Molecular  - CBC Auto Differential; Future  - X-Ray Chest PA And Lateral; Future    2. Cough, unspecified type  - X-Ray Chest PA And Lateral; Future  - benzonatate (TESSALON) 200 MG capsule; Take 1 capsule (200 mg total) by mouth 3 (three) times daily as needed for Cough.  Dispense: 30 capsule; Refill: 0    3. Fatigue, unspecified type  - CBC Auto Differential; Future    4. Type 2 diabetes mellitus with hyperglycemia, without long-term current use of insulin  - Hemoglobin A1C; Future  - " Comprehensive Metabolic Panel; Future  - Lipid Panel; Future  - Microalbumin/Creatinine Ratio, Urine; Future    5. Essential hypertension  - Comprehensive Metabolic Panel; Future  - Lipid Panel; Future    6. Dyslipidemia associated with type 2 diabetes mellitus  - Comprehensive Metabolic Panel; Future  - Lipid Panel; Future  - atorvastatin (LIPITOR) 20 MG tablet; Take 1 tablet (20 mg total) by mouth every evening.  Dispense: 90 tablet; Refill: 3    7. Morbid obesity with BMI of 45.0-49.9, adult    8. Obstructive sleep apnea (AHI = 21)    9. Onychomycosis of multiple toenails with type 2 diabetes mellitus  - Ambulatory referral/consult to Podiatry; Future    10. Encounter for diabetic foot exam       Follow up if symptoms worsen or fail to improve.   Future Appointments   Date Time Provider Department Center   10/11/2022  9:30 AM DIGITAL MEDICINE, [facility] IM [facility]   10/11/2022 10:50 AM SPECIMEN, [facility] SPECLAB [facility]   10/11/2022 12:15 PM LABORATORY, [facility] LAB [facility]   10/13/2022  8:30 AM Asif Stewart DPM [facility] POD [facility]   10/13/2022 10:30 AM Vania Correa NP [facility] DIABETE [facility]   11/28/2022  9:00 AM ARUN Monson MD [facility]     Problem List Items Addressed This Visit       Dyslipidemia associated with type 2 diabetes mellitus (Chronic)    Current Assessment & Plan     Lab Results   Component Value Date    CHOL 96 (L) 12/02/2021    CHOL 160 06/08/2021    TRIG 96 12/02/2021    TRIG 88 06/08/2021    HDL 30 (L) 12/02/2021    HDL 41 06/08/2021    LDLCALC 46.8 (L) 12/02/2021    LDLCALC 101.4 06/08/2021    NONHDLCHOL 66 12/02/2021    NONHDLCHOL 119 06/08/2021    AST 29 06/08/2021    ALT 53 (H) 06/08/2021          Relevant Medications    atorvastatin (LIPITOR) 20 MG tablet    Other Relevant Orders    Comprehensive Metabolic Panel    Lipid Panel    Essential hypertension (Chronic)    Relevant Orders    Comprehensive Metabolic Panel    Lipid Panel    Morbid obesity  "with BMI of 45.0-49.9, adult (Chronic)    Current Assessment & Plan     Wt Readings from Last 6 Encounters:   10/11/22 (!) 169.7 kg (374 lb 1.9 oz)   07/27/22 (!) 171.6 kg (378 lb 5 oz)   04/07/22 (!) 171.9 kg (379 lb)   02/23/22 (!) 172.1 kg (379 lb 6.6 oz)   12/02/21 (!) 173.2 kg (381 lb 13.4 oz)   08/13/21 (!) 175.1 kg (386 lb)   Estimated body mass index is 48.03 kg/m² as calculated from the following:    Height as of 7/27/22: 6' 2" (1.88 m).    Weight as of this encounter: 169.7 kg (374 lb 1.9 oz).    Therapeutic lifestyle changes encouraged. Recommended referral to Lifestyle & Wellness clinic.          Obstructive sleep apnea (AHI = 21) (Chronic)    Overview     11/10/2020 Home Sleep Test AHI = 21         Current Assessment & Plan     He reports compliance with use of CPAP for treatment of obstructive sleep apnea, and he reports perceived therapeutic benefit.         Onychomycosis of multiple toenails with type 2 diabetes mellitus (Chronic)    Current Assessment & Plan     DIABETIC FOOT EXAM: Inspection of feet reveals onychomycosis of multiple nails without paronychia. His feet show very dry skin with no open wounds, ulcerations, significant calluses, or evidence of arterial insufficiency. 10g monofilament sensation is intact in all 5 locations tested.           Relevant Orders    Ambulatory referral/consult to Podiatry    Type 2 diabetes mellitus with hyperglycemia, without long-term current use of insulin (Chronic)    Current Assessment & Plan     Diabetes Management Status    Statin: Taking  ACE/ARB: Taking    Screening or Prevention Patient's value Goal Complete/Controlled?   HgA1C Testing and Control   Lab Results   Component Value Date    HGBA1C 9.0 (H) 05/16/2022      Annually/Less than 8% No   Lipid profile : 12/02/2021 Annually Yes   LDL control Lab Results   Component Value Date    LDLCALC 46.8 (L) 12/02/2021    Annually/Less than 100 mg/dl  Yes   Nephropathy screening Lab Results   Component Value " Date    LABMICR 15.0 12/02/2021     No results found for: PROTEINUA Annually Yes   Blood pressure BP Readings from Last 1 Encounters:   10/11/22 (!) 126/96    Less than 140/90 No   Dilated retinal exam : 12/22/2020 Annually Yes   Foot exam   : 12/02/2021 Annually Yes     Lab Results   Component Value Date    HGBA1C 9.0 (H) 05/16/2022    HGBA1C 7.7 (H) 02/16/2022    HGBA1C 7.7 (H) 12/02/2021    MICALBCREAT Unable to calculate 12/02/2021    LDLCALC 46.8 (L) 12/02/2021   No results found for: GLUTAMICACID, CPEPTIDE   Last 6 Patient Entered Readings                                          Most Recent A1c: 9% on 5/16/2022  (Goal: 7%)       Recent Readings 6/26/2022 6/25/2022 5/13/2022 5/6/2022 4/28/2022    Blood Glucose (mg/dL) 115 114 166 192 165      HEALTH MAINTENANCE: Diabetic health maintenance interventions reviewed and are up to date except for:      Topic    Eye Exam     Hemoglobin A1C              Relevant Orders    Hemoglobin A1C    Comprehensive Metabolic Panel    Lipid Panel    Microalbumin/Creatinine Ratio, Urine     Other Visit Diagnoses       Flu syndrome    -  Primary    Relevant Orders    POCT Influenza A/B Molecular (Completed)    CBC Auto Differential    X-Ray Chest PA And Lateral    Cough, unspecified type        Relevant Medications    benzonatate (TESSALON) 200 MG capsule    Other Relevant Orders    X-Ray Chest PA And Lateral    Fatigue, unspecified type        Relevant Orders    CBC Auto Differential    Encounter for diabetic foot exam            Unless noted herein, any chronic conditions are represented as and appear compensated/controlled and stable, and no other significant complaints or concerns were reported.    PRESCRIPTION DRUG MANAGEMENT  Outpatient Medications Prior to Visit   Medication Sig Dispense Refill    blood sugar diagnostic Strp Check blood glucose 1 times daily (BEFORE breakfast or other meal) and as needed for symptoms of low or high blood glucose 100 each 11    blood-glucose  meter kit Check blood glucose 1 times daily (BEFORE breakfast or other meal) and as needed for symptoms of low or high blood glucose 1 each 0    lancets Misc Check blood glucose 1 times daily (BEFORE breakfast or other meal) and as needed for symptoms of low or high blood glucose 100 each 11    metFORMIN (GLUCOPHAGE) 1000 MG tablet Take 1 tablet (1,000 mg total) by mouth 2 (two) times daily with meals. 180 tablet 0    ONETOUCH DELICA PLUS LANCET 30 gauge Misc       ONETOUCH ULTRA2 METER Misc SMARTSI Each Via Meter As Directed      valsartan-hydrochlorothiazide (DIOVAN-HCT) 160-12.5 mg per tablet Take 1 tablet by mouth once daily. 90 tablet 1    dapagliflozin (FARXIGA) 5 mg Tab tablet Take 1 tablet (5 mg total) by mouth once daily. 30 tablet 1    diclofenac sodium (SOLARAZE) 3 % gel Apply small amount (2 g) to painful joints 3 times daily as needed. 200 g 5    hydrocortisone 2.5 % cream Apply topically 2 (two) times daily. Mix small amount with ketoconazole cream and AAA on groin BID for up to 2 weeks at a time 30 g 5    ketoconazole (NIZORAL) 2 % cream Apply topically 2 (two) times daily. Mix small amount with hydrocortisone cream and AAA on groin BID for up to 2 weeks at a time 60 g 5    multivitamin (THERAGRAN) per tablet Take 1 tablet by mouth once daily.      rosuvastatin (CRESTOR) 20 MG tablet Take 1 tablet (20 mg total) by mouth once daily. (Patient not taking: Reported on 10/11/2022) 90 tablet 0     No facility-administered medications prior to visit.     Medications Discontinued During This Encounter   Medication Reason    rosuvastatin (CRESTOR) 20 MG tablet Patient no longer taking    multivitamin (THERAGRAN) per tablet Patient no longer taking    ketoconazole (NIZORAL) 2 % cream Patient no longer taking    hydrocortisone 2.5 % cream Patient no longer taking    diclofenac sodium (SOLARAZE) 3 % gel Patient no longer taking    dapagliflozin (FARXIGA) 5 mg Tab tablet Patient no longer taking     Medications  Ordered This Encounter   Medications    atorvastatin (LIPITOR) 20 MG tablet     Sig: Take 1 tablet (20 mg total) by mouth every evening.     Dispense:  90 tablet     Refill:  3    benzonatate (TESSALON) 200 MG capsule     Sig: Take 1 capsule (200 mg total) by mouth 3 (three) times daily as needed for Cough.     Dispense:  30 capsule     Refill:  0     PHYSICAL EXAM  Vitals:    10/11/22 0814   BP: (!) 126/96   BP Location: Left arm   Patient Position: Sitting   BP Method: Large (Manual)   Pulse: 90   Temp: 98.3 °F (36.8 °C)   TempSrc: Tympanic   SpO2: 98%   Weight: (!) 169.7 kg (374 lb 1.9 oz)   Physical Exam  Vitals reviewed.   Constitutional:       General: He is not in acute distress.     Appearance: Normal appearance. He is not ill-appearing, toxic-appearing or diaphoretic.   HENT:      Right Ear: Tympanic membrane, ear canal and external ear normal. There is no impacted cerumen.      Left Ear: Tympanic membrane, ear canal and external ear normal. There is no impacted cerumen.      Nose: Congestion present. No rhinorrhea.      Mouth/Throat:      Mouth: Mucous membranes are moist.      Pharynx: Oropharynx is clear. No oropharyngeal exudate or posterior oropharyngeal erythema.   Eyes:      General:         Right eye: No discharge.         Left eye: No discharge.   Cardiovascular:      Rate and Rhythm: Normal rate and regular rhythm.      Heart sounds: Normal heart sounds.   Pulmonary:      Effort: Pulmonary effort is normal.      Breath sounds: Normal breath sounds.   Skin:     General: Skin is warm and dry.   Neurological:      General: No focal deficit present.      Mental Status: He is alert and oriented to person, place, and time.   Psychiatric:         Mood and Affect: Mood normal.         Behavior: Behavior normal.         Judgment: Judgment normal.        Documentation entered by me for this encounter may have been done in part using speech-recognition technology. Although I have made an effort to ensure  "accuracy, "sound like" errors may exist and should be interpreted in context.  "

## 2022-10-11 NOTE — ASSESSMENT & PLAN NOTE
DIABETIC FOOT EXAM: Inspection of feet reveals onychomycosis of multiple nails without paronychia. His feet show very dry skin with no open wounds, ulcerations, significant calluses, or evidence of arterial insufficiency. 10g monofilament sensation is intact in all 5 locations tested.

## 2022-10-11 NOTE — ASSESSMENT & PLAN NOTE
Lab Results   Component Value Date    CHOL 96 (L) 12/02/2021    CHOL 160 06/08/2021    TRIG 96 12/02/2021    TRIG 88 06/08/2021    HDL 30 (L) 12/02/2021    HDL 41 06/08/2021    LDLCALC 46.8 (L) 12/02/2021    LDLCALC 101.4 06/08/2021    NONHDLCHOL 66 12/02/2021    NONHDLCHOL 119 06/08/2021    AST 29 06/08/2021    ALT 53 (H) 06/08/2021

## 2022-10-11 NOTE — ASSESSMENT & PLAN NOTE
Diabetes Management Status    Statin: Taking  ACE/ARB: Taking    Screening or Prevention Patient's value Goal Complete/Controlled?   HgA1C Testing and Control   Lab Results   Component Value Date    HGBA1C 9.0 (H) 05/16/2022      Annually/Less than 8% No   Lipid profile : 12/02/2021 Annually Yes   LDL control Lab Results   Component Value Date    LDLCALC 46.8 (L) 12/02/2021    Annually/Less than 100 mg/dl  Yes   Nephropathy screening Lab Results   Component Value Date    LABMICR 15.0 12/02/2021     No results found for: PROTEINUA Annually Yes   Blood pressure BP Readings from Last 1 Encounters:   10/11/22 (!) 126/96    Less than 140/90 No   Dilated retinal exam : 12/22/2020 Annually Yes   Foot exam   : 12/02/2021 Annually Yes     Lab Results   Component Value Date    HGBA1C 9.0 (H) 05/16/2022    HGBA1C 7.7 (H) 02/16/2022    HGBA1C 7.7 (H) 12/02/2021    MICALBCREAT Unable to calculate 12/02/2021    LDLCALC 46.8 (L) 12/02/2021     No results found for: GLUTAMICACID, CPEPTIDE   Last 6 Patient Entered Readings                                          Most Recent A1c: 9% on 5/16/2022  (Goal: 7%)     Recent Readings 6/26/2022 6/25/2022 5/13/2022 5/6/2022 4/28/2022    Blood Glucose (mg/dL) 115 114 166 192 165         HEALTH MAINTENANCE: Diabetic health maintenance interventions reviewed and are up to date except for:      Topic    Eye Exam     Hemoglobin A1C

## 2022-10-13 ENCOUNTER — PATIENT MESSAGE (OUTPATIENT)
Dept: DIABETES | Facility: CLINIC | Age: 42
End: 2022-10-13

## 2022-10-13 NOTE — PROGRESS NOTES
"NORMAL  ----------  If you want to learn more about your test results and what they mean, it's as simple as 1, 2, 3.  1. Log in to [facility] using the [facility] or online at [email or web address].   2. From the "Test Results" tab, click on the test you want to know more about.  3. If using the mobile lexx, click the "Get Info" icon. (The "Get Info" icon looks like a [place] with a lower case letter i inside it.) If using your computer, click the "Get Info" icon or the "About This Test" link."

## 2022-10-13 NOTE — PROGRESS NOTES
"NORMAL  ----------  If you want to learn more about your test results and what they mean, it's as simple as 1, 2, 3.  1. Log in to [facility] using the [facility] or online at [email or web address].   2. From the "Test Results" tab, click on the test you want to know more about.  3. If using the mobile lexx, click the "Get Info" icon. (The "Get Info" icon looks like a [place] with a lower case letter i inside it.) If using your computer, click the "Get Info" icon or the "About This Test" link."

## 2022-10-18 ENCOUNTER — PATIENT MESSAGE (OUTPATIENT)
Dept: ADMINISTRATIVE | Facility: HOSPITAL | Age: 42
End: 2022-10-18
Payer: COMMERCIAL

## 2022-10-19 ENCOUNTER — OFFICE VISIT (OUTPATIENT)
Dept: PODIATRY | Facility: CLINIC | Age: 42
End: 2022-10-19
Payer: COMMERCIAL

## 2022-10-19 VITALS
DIASTOLIC BLOOD PRESSURE: 98 MMHG | SYSTOLIC BLOOD PRESSURE: 144 MMHG | WEIGHT: 315 LBS | HEART RATE: 79 BPM | BODY MASS INDEX: 49.19 KG/M2

## 2022-10-19 DIAGNOSIS — L85.3 XEROSIS OF SKIN: ICD-10-CM

## 2022-10-19 DIAGNOSIS — M21.42 PES PLANUS OF BOTH FEET: ICD-10-CM

## 2022-10-19 DIAGNOSIS — E11.9 ENCOUNTER FOR COMPREHENSIVE DIABETIC FOOT EXAMINATION, TYPE 2 DIABETES MELLITUS: Primary | ICD-10-CM

## 2022-10-19 DIAGNOSIS — M20.42 HAMMER TOES OF BOTH FEET: ICD-10-CM

## 2022-10-19 DIAGNOSIS — M20.41 HAMMER TOES OF BOTH FEET: ICD-10-CM

## 2022-10-19 DIAGNOSIS — M21.41 PES PLANUS OF BOTH FEET: ICD-10-CM

## 2022-10-19 PROCEDURE — 3080F DIAST BP >= 90 MM HG: CPT | Mod: CPTII,S$GLB,, | Performed by: PODIATRIST

## 2022-10-19 PROCEDURE — 3066F NEPHROPATHY DOC TX: CPT | Mod: CPTII,S$GLB,, | Performed by: PODIATRIST

## 2022-10-19 PROCEDURE — 3051F HG A1C>EQUAL 7.0%<8.0%: CPT | Mod: CPTII,S$GLB,, | Performed by: PODIATRIST

## 2022-10-19 PROCEDURE — 3061F NEG MICROALBUMINURIA REV: CPT | Mod: CPTII,S$GLB,, | Performed by: PODIATRIST

## 2022-10-19 PROCEDURE — 99999 PR PBB SHADOW E&M-EST. PATIENT-LVL III: CPT | Mod: PBBFAC,,, | Performed by: PODIATRIST

## 2022-10-19 PROCEDURE — 99203 OFFICE O/P NEW LOW 30 MIN: CPT | Mod: S$GLB,,, | Performed by: PODIATRIST

## 2022-10-19 PROCEDURE — 99999 PR PBB SHADOW E&M-EST. PATIENT-LVL III: ICD-10-PCS | Mod: PBBFAC,,, | Performed by: PODIATRIST

## 2022-10-19 PROCEDURE — 3061F PR NEG MICROALBUMINURIA RESULT DOCUMENTED/REVIEW: ICD-10-PCS | Mod: CPTII,S$GLB,, | Performed by: PODIATRIST

## 2022-10-19 PROCEDURE — 1160F RVW MEDS BY RX/DR IN RCRD: CPT | Mod: CPTII,S$GLB,, | Performed by: PODIATRIST

## 2022-10-19 PROCEDURE — 3051F PR MOST RECENT HEMOGLOBIN A1C LEVEL 7.0 - < 8.0%: ICD-10-PCS | Mod: CPTII,S$GLB,, | Performed by: PODIATRIST

## 2022-10-19 PROCEDURE — 1159F PR MEDICATION LIST DOCUMENTED IN MEDICAL RECORD: ICD-10-PCS | Mod: CPTII,S$GLB,, | Performed by: PODIATRIST

## 2022-10-19 PROCEDURE — 3066F PR DOCUMENTATION OF TREATMENT FOR NEPHROPATHY: ICD-10-PCS | Mod: CPTII,S$GLB,, | Performed by: PODIATRIST

## 2022-10-19 PROCEDURE — 99203 PR OFFICE/OUTPT VISIT, NEW, LEVL III, 30-44 MIN: ICD-10-PCS | Mod: S$GLB,,, | Performed by: PODIATRIST

## 2022-10-19 PROCEDURE — 1159F MED LIST DOCD IN RCRD: CPT | Mod: CPTII,S$GLB,, | Performed by: PODIATRIST

## 2022-10-19 PROCEDURE — 3077F SYST BP >= 140 MM HG: CPT | Mod: CPTII,S$GLB,, | Performed by: PODIATRIST

## 2022-10-19 PROCEDURE — 3080F PR MOST RECENT DIASTOLIC BLOOD PRESSURE >= 90 MM HG: ICD-10-PCS | Mod: CPTII,S$GLB,, | Performed by: PODIATRIST

## 2022-10-19 PROCEDURE — 1160F PR REVIEW ALL MEDS BY PRESCRIBER/CLIN PHARMACIST DOCUMENTED: ICD-10-PCS | Mod: CPTII,S$GLB,, | Performed by: PODIATRIST

## 2022-10-19 PROCEDURE — 3077F PR MOST RECENT SYSTOLIC BLOOD PRESSURE >= 140 MM HG: ICD-10-PCS | Mod: CPTII,S$GLB,, | Performed by: PODIATRIST

## 2022-10-19 NOTE — PROGRESS NOTES
Subjective:     Patient ID: Josesito Crabtree is a 42 y.o. male.    Chief Complaint: Ankle Pain (Bilateral ankle pain, rates pain 8/10, Diabetic pt, Last seen on 10/11/22 with PCP Dr. Monson. Wearing sneakers today.)    Josesito is a 42 y.o. male who presents to the clinic upon referral from Dr. Monson  for evaluation and treatment of diabetic feet. Josesito has a past medical history of Essential hypertension (10/5/2020), Primary localized osteoarthritis of knees, bilateral (2/23/2022), Type 2 diabetes mellitus with microalbuminuria, without long-term current use of insulin (12/18/2021), and Type 2 diabetes mellitus with other specified complication (10/19/2020). Patient relates no major problem with feet. Only complaints today consist of right medial ankle pain. Patient admits histroy of trauma to ankle. Patient states the pain comes and goes.     PCP: JR Monson MD  referring provider  Date Last Seen by PCP: 10/11/2022    Current shoe gear: Tennis shoes    Hemoglobin A1C   Date Value Ref Range Status   10/11/2022 7.7 (H) 4.0 - 5.6 % Final     Comment:     ADA Screening Guidelines:  5.7-6.4%  Consistent with prediabetes  >or=6.5%  Consistent with diabetes    High levels of fetal hemoglobin interfere with the HbA1C  assay. Heterozygous hemoglobin variants (HbS, HgC, etc)do  not significantly interfere with this assay.   However, presence of multiple variants may affect accuracy.     05/16/2022 9.0 (H) 4.0 - 5.6 % Final     Comment:     ADA Screening Guidelines:  5.7-6.4%  Consistent with prediabetes  >or=6.5%  Consistent with diabetes    High levels of fetal hemoglobin interfere with the HbA1C  assay. Heterozygous hemoglobin variants (HbS, HgC, etc)do  not significantly interfere with this assay.   However, presence of multiple variants may affect accuracy.     02/16/2022 7.7 (H) 4.0 - 5.6 % Final     Comment:     ADA Screening Guidelines:  5.7-6.4%  Consistent with prediabetes  >or=6.5%  Consistent with  diabetes    High levels of fetal hemoglobin interfere with the HbA1C  assay. Heterozygous hemoglobin variants (HbS, HgC, etc)do  not significantly interfere with this assay.   However, presence of multiple variants may affect accuracy.           Patient Active Problem List   Diagnosis    Morbid obesity with BMI of 45.0-49.9, adult    Essential hypertension    Cigarette nicotine dependence, uncomplicated    Bilateral chronic knee pain    Acanthosis nigricans    Obstructive sleep apnea (AHI = 21)    Chronic venous hypertension involving both sides    Type 2 diabetes mellitus with hyperglycemia, without long-term current use of insulin    Dyslipidemia associated with type 2 diabetes mellitus    Ganglion cyst of volar aspect of left wrist    Type 2 diabetes mellitus with microalbuminuria, without long-term current use of insulin    Primary localized osteoarthritis of knees, bilateral    Primary localized osteoarthritis of ankles, bilateral    Onychomycosis of multiple toenails with type 2 diabetes mellitus       Medication List with Changes/Refills   Current Medications    ATORVASTATIN (LIPITOR) 20 MG TABLET    Take 1 tablet (20 mg total) by mouth every evening.    BENZONATATE (TESSALON) 200 MG CAPSULE    Take 1 capsule (200 mg total) by mouth 3 (three) times daily as needed for Cough.    BLOOD SUGAR DIAGNOSTIC STRP    Check blood glucose 1 times daily (BEFORE breakfast or other meal) and as needed for symptoms of low or high blood glucose    BLOOD-GLUCOSE METER KIT    Check blood glucose 1 times daily (BEFORE breakfast or other meal) and as needed for symptoms of low or high blood glucose    LANCETS MISC    Check blood glucose 1 times daily (BEFORE breakfast or other meal) and as needed for symptoms of low or high blood glucose    METFORMIN (GLUCOPHAGE) 1000 MG TABLET    Take 1 tablet (1,000 mg total) by mouth 2 (two) times daily with meals.    ONETOUCH DELICA PLUS LANCET 30 GAUGE [facility]        ONETOUCH ULTRA2 METER MISC     SMARTSI Each Via Meter As Directed    VALSARTAN-HYDROCHLOROTHIAZIDE (DIOVAN-HCT) 160-12.5 MG PER TABLET    Take 1 tablet by mouth once daily.       Review of patient's allergies indicates:  No Known Allergies    Past Surgical History:   Procedure Laterality Date    KNEE SURGERY Left        Family History   Problem Relation Age of Onset    No Known Problems Mother     No Known Problems Father        Social History     Socioeconomic History    Marital status:    Tobacco Use    Smoking status: Every Day     Packs/day: 0.50     Types: Cigarettes    Smokeless tobacco: Never   Substance and Sexual Activity    Alcohol use: Yes    Drug use: Never    Sexual activity: Yes     Partners: Female       Vitals:    10/19/22 0835   BP: (!) 144/98   Pulse: 79   Weight: (!) 173.8 kg (383 lb 2.6 oz)   PainSc:   8   PainLoc: Ankle       Hemoglobin A1C   Date Value Ref Range Status   10/11/2022 7.7 (H) 4.0 - 5.6 % Final     Comment:     ADA Screening Guidelines:  5.7-6.4%  Consistent with prediabetes  >or=6.5%  Consistent with diabetes    High levels of fetal hemoglobin interfere with the HbA1C  assay. Heterozygous hemoglobin variants (HbS, HgC, etc)do  not significantly interfere with this assay.   However, presence of multiple variants may affect accuracy.     2022 9.0 (H) 4.0 - 5.6 % Final     Comment:     ADA Screening Guidelines:  5.7-6.4%  Consistent with prediabetes  >or=6.5%  Consistent with diabetes    High levels of fetal hemoglobin interfere with the HbA1C  assay. Heterozygous hemoglobin variants (HbS, HgC, etc)do  not significantly interfere with this assay.   However, presence of multiple variants may affect accuracy.     2022 7.7 (H) 4.0 - 5.6 % Final     Comment:     ADA Screening Guidelines:  5.7-6.4%  Consistent with prediabetes  >or=6.5%  Consistent with diabetes    High levels of fetal hemoglobin interfere with the HbA1C  assay. Heterozygous hemoglobin variants (HbS, HgC, etc)do  not  significantly interfere with this assay.   However, presence of multiple variants may affect accuracy.         Review of Systems   Constitutional:  Negative for chills and fever.   Respiratory:  Negative for shortness of breath.    Cardiovascular:  Negative for chest pain, palpitations, orthopnea, claudication and leg swelling.   Gastrointestinal:  Negative for diarrhea, nausea and vomiting.   Musculoskeletal:  Negative for joint pain.   Skin:  Negative for rash.   Neurological:  Negative for dizziness, tingling, sensory change, focal weakness and weakness.   Psychiatric/Behavioral: Negative.             Objective:      PHYSICAL EXAM: Apperance: Alert and orient in no distress,well developed, and with good attention to grooming and body habits  Patient presents ambulating in tennis shoes.   LOWER EXTREMITY EXAM:  VASCULAR: Dorsalis pedis pulses 2/4 bilateral and Posterior Tibial pulses 2/4 bilateral. Capillary fill time <4 seconds bilateral. No edema observed bilateral. Varicosities absent bilateral. Skin temperature of the lower extremities is warm to warm, proximal to distal. Hair growth WNL bilateral.  DERMATOLOGICAL: No skin rashes, subcutaneous nodules, lesions, or ulcers observed bilateral. Nails 1,2,3,4,5 bilateral normal length. Webspaces 1,2,3,4, bilateral clean, dry and without evidence of break in skin integrity. Dry and scaly skin noted bilateral.   NEUROLOGICAL: Light touch, sharp-dull, proprioception all present and equal bilaterally.  Vibratory sensation intact at bilateral hallux. Protective sensation intact at all 10 sites as tested with a [place]a 5.07 monofilament.   MUSCULOSKELETAL: Muscle strength is 5/5 for foot inverters, everters, plantarflexors, and dorsiflexors. Muscle tone is normal. Tenderness on palpation of right medial ankle along the posterior tibial tendon. Flexible hammertoes bilateral.         Assessment:       ICD-10-CM ICD-9-CM   1. Encounter for comprehensive diabetic  foot examination, type 2 diabetes mellitus  E11.9 250.00   2. Xerosis of skin  L85.3 706.8   3. Pes planus of both feet  M21.41 734    M21.42    4. Hammer toes of both feet  M20.41 735.4    M20.42        Plan:   Encounter for comprehensive diabetic foot examination, type 2 diabetes mellitus  -     Ambulatory referral/consult to Podiatry    Xerosis of skin    Pes planus of both feet    Hammer toes of both feet    I counseled the patient on his conditions, regarding findings of my examination, my impressions, and usual treatment plan.   Greater than 50% of this visit spent on counseling and coordination of care.  Greater than 12 minutes of a 15 minute appointment spent on education about the diabetic foot, neuropathy, and prevention of limb loss.  Shoe inspection. Diabetic Foot Education. Patient reminded of the importance of good nutrition and blood sugar control to help prevent podiatric complications of diabetes. Patient instructed on proper foot hygeine. We discussed wearing proper shoe gear, daily foot inspections, never walking without protective shoe gear, never putting sharp instruments to feet.    Discussed surgical and conservative management of hammertoe/flatfoot deformity. Conservatively we did discuss padding, and shoe modifications such as softer shoes with wide toe boxes. Surgically we briefly discussed pre and post operative expectations. The patient elects for conservative management at this time   The patient and I reviewed the types of shoes he should be wearing, my recommendation includes generally the best time of the day for a shoe fitting is the afternoon, shoes with a wide toe box, very good cushion, and tennis shoes with removable inner soles.The patient and I reviewed my recommendations for over-the-counter orthotic inserts.   Patient  will continue to monitor the areas daily, inspect feet, wear protective shoe gear when ambulatory, moisturizer to maintain skin integrity. Patient reminded of  the importance of good nutrition and blood sugar control to help prevent podiatric complications of diabetes.  Patient to return 12 months or sooner if needed.          Asif Stewart DPM  [facility]

## 2023-01-10 ENCOUNTER — PATIENT OUTREACH (OUTPATIENT)
Dept: ADMINISTRATIVE | Facility: HOSPITAL | Age: 43
End: 2023-01-10
Payer: COMMERCIAL

## 2023-01-10 NOTE — PROGRESS NOTES
Working DM Eye Report:     Patient overdue for DM eye exam.  Called to schedule eye exam. No answer, LVM for a return call.

## 2023-01-25 ENCOUNTER — PATIENT MESSAGE (OUTPATIENT)
Dept: ADMINISTRATIVE | Facility: HOSPITAL | Age: 43
End: 2023-01-25
Payer: COMMERCIAL

## 2023-03-27 ENCOUNTER — PATIENT OUTREACH (OUTPATIENT)
Dept: ADMINISTRATIVE | Facility: HOSPITAL | Age: 43
End: 2023-03-27
Payer: COMMERCIAL

## 2023-03-27 NOTE — LETTER
AUTHORIZATION FOR RELEASE OF   CONFIDENTIAL INFORMATION      We are seeing Josesito Crabtree, date of birth 1980, in the clinic at [facility] INTER[facility]. JR Monson MD is the patient's PCP. Josesito Crabtree has an outstanding lab/procedure at the time we reviewed his chart. In order to help keep his health information updated, he has authorized us to request the following medical record(s):        (  )  MAMMOGRAM                                      (  )  COLONOSCOPY      (  )  PAP SMEAR                                          (  )  OUTSIDE LAB RESULTS     (  )  DEXA SCAN                                          ( X )  EYE EXAM            (  )  FOOT EXAM                                          (  )  ENTIRE RECORD     (  )  OUTSIDE IMMUNIZATIONS                 (  )  _______________         Please fax records to [facility], L Lee [place], MD, [phone]      Patient Name: Josesito Crabtree  : 1980  Patient Phone #: [phone]

## 2023-04-19 ENCOUNTER — PATIENT MESSAGE (OUTPATIENT)
Dept: ADMINISTRATIVE | Facility: HOSPITAL | Age: 43
End: 2023-04-19
Payer: COMMERCIAL

## 2023-07-10 ENCOUNTER — PATIENT MESSAGE (OUTPATIENT)
Dept: ADMINISTRATIVE | Facility: HOSPITAL | Age: 43
End: 2023-07-10
Payer: COMMERCIAL

## 2023-07-28 DIAGNOSIS — E11.65 TYPE 2 DIABETES MELLITUS WITH HYPERGLYCEMIA, WITHOUT LONG-TERM CURRENT USE OF INSULIN: ICD-10-CM

## 2023-07-28 DIAGNOSIS — E11.69 TYPE 2 DIABETES MELLITUS WITH OTHER SPECIFIED COMPLICATION, WITHOUT LONG-TERM CURRENT USE OF INSULIN: Chronic | ICD-10-CM

## 2023-07-29 NOTE — TELEPHONE ENCOUNTER
Care Due:                  Date            Visit Type   Department     Provider  --------------------------------------------------------------------------------                                EP -                              PRIMARY      [facility] INTERNAL  Last Visit: 10-      [facility] ([facility]   MEDICINE       Tomer Monson  Next Visit: None Scheduled  None         None Found                                                            Last  Test          Frequency    Reason                     Performed    Due Date  --------------------------------------------------------------------------------    Office Visit  12 months..  atorvastatin, metFORMIN,   10-   10-                             valsartan-hydrochlorothia                             zide.....................    CMP.........  12 months..  atorvastatin, metFORMIN,   10-   10-                             valsartan-hydrochlorothia                             zide.....................    HBA1C.......  6 months...  metFORMIN................  10-   04-    Lipid Panel.  12 months..  atorvastatin.............  10-   10-    [facility] Embedded Care Due Messages. Reference number: [id number].   7/29/2023 8:58:03 AM CDT

## 2023-07-29 NOTE — TELEPHONE ENCOUNTER
Refill Routing Note   Medication(s) are not appropriate for processing by [facility] for the following reason(s):      Required labs outdated    [facility] action(s):  Defer Care Due:  Appointment due  Labs due            Appointments  past 12m or future 3m with PCP    Date Provider   Last Visit   10/11/2022 JR Monson MD   Next Visit   Visit date not found JR Monson MD   ED visits in past 90 days: 0        Note composed:8:59 AM 07/29/2023

## 2023-08-02 RX ORDER — METFORMIN HYDROCHLORIDE 1000 MG/1
TABLET ORAL
Qty: 180 TABLET | Refills: 0 | OUTPATIENT
Start: 2023-08-02

## 2023-08-02 NOTE — TELEPHONE ENCOUNTER
Refill not approved. REASON: He's overdue for labs and appointment.    TO MY TEAM:  Please order labs below by written order guidelines and help patient schedule labs ASAP and F/U OV with me.  A1c [LAB90]  CMP [LAB17]  Lipid Panel [LAB18]  Microalbumin/creatinine urine [SVN723]    After he has completed labs, he can re-submit refill request if additional refills needed to last until next appointment with me.     Thanks.

## 2023-08-03 ENCOUNTER — LAB VISIT (OUTPATIENT)
Dept: LAB | Facility: HOSPITAL | Age: 43
End: 2023-08-03
Attending: FAMILY MEDICINE
Payer: COMMERCIAL

## 2023-08-03 DIAGNOSIS — E11.69 TYPE 2 DIABETES MELLITUS WITH OTHER SPECIFIED COMPLICATION, WITHOUT LONG-TERM CURRENT USE OF INSULIN: Chronic | ICD-10-CM

## 2023-08-03 DIAGNOSIS — E11.65 TYPE 2 DIABETES MELLITUS WITH HYPERGLYCEMIA, WITHOUT LONG-TERM CURRENT USE OF INSULIN: ICD-10-CM

## 2023-08-03 LAB
ALBUMIN SERPL BCP-MCNC: 3.9 G/DL (ref 3.5–5.2)
ALBUMIN/CREAT UR: 5.6 UG/MG (ref 0–30)
ALP SERPL-CCNC: 65 U/L (ref 55–135)
ALT SERPL W/O P-5'-P-CCNC: 55 U/L (ref 10–44)
ANION GAP SERPL CALC-SCNC: 9 MMOL/L (ref 8–16)
AST SERPL-CCNC: 31 U/L (ref 10–40)
BILIRUB SERPL-MCNC: 0.4 MG/DL (ref 0.1–1)
BUN SERPL-MCNC: 14 MG/DL (ref 6–20)
CALCIUM SERPL-MCNC: 9.2 MG/DL (ref 8.7–10.5)
CHLORIDE SERPL-SCNC: 106 MMOL/L (ref 95–110)
CHOLEST SERPL-MCNC: 156 MG/DL (ref 120–199)
CHOLEST/HDLC SERPL: 4.1 {RATIO} (ref 2–5)
CO2 SERPL-SCNC: 24 MMOL/L (ref 23–29)
CREAT SERPL-MCNC: 1.1 MG/DL (ref 0.5–1.4)
CREAT UR-MCNC: 288 MG/DL (ref 23–375)
EST. GFR  (NO RACE VARIABLE): >60 ML/MIN/1.73 M^2
ESTIMATED AVG GLUCOSE: 163 MG/DL (ref 68–131)
GLUCOSE SERPL-MCNC: 136 MG/DL (ref 70–110)
HBA1C MFR BLD: 7.3 % (ref 4–5.6)
HDLC SERPL-MCNC: 38 MG/DL (ref 40–75)
HDLC SERPL: 24.4 % (ref 20–50)
LDLC SERPL CALC-MCNC: 100.8 MG/DL (ref 63–159)
MICROALBUMIN UR DL<=1MG/L-MCNC: 16 UG/ML
NONHDLC SERPL-MCNC: 118 MG/DL
POTASSIUM SERPL-SCNC: 4.5 MMOL/L (ref 3.5–5.1)
PROT SERPL-MCNC: 7.2 G/DL (ref 6–8.4)
SODIUM SERPL-SCNC: 139 MMOL/L (ref 136–145)
TRIGL SERPL-MCNC: 86 MG/DL (ref 30–150)

## 2023-08-03 PROCEDURE — 80053 COMPREHEN METABOLIC PANEL: CPT | Performed by: FAMILY MEDICINE

## 2023-08-03 PROCEDURE — 36415 COLL VENOUS BLD VENIPUNCTURE: CPT | Performed by: FAMILY MEDICINE

## 2023-08-03 PROCEDURE — 80061 LIPID PANEL: CPT | Performed by: FAMILY MEDICINE

## 2023-08-03 PROCEDURE — 82570 ASSAY OF URINE CREATININE: CPT | Performed by: FAMILY MEDICINE

## 2023-08-03 PROCEDURE — 83036 HEMOGLOBIN GLYCOSYLATED A1C: CPT | Performed by: FAMILY MEDICINE

## 2023-08-04 ENCOUNTER — PATIENT MESSAGE (OUTPATIENT)
Dept: ADMINISTRATIVE | Facility: HOSPITAL | Age: 43
End: 2023-08-04
Payer: COMMERCIAL

## 2023-08-09 DIAGNOSIS — E11.65 TYPE 2 DIABETES MELLITUS WITH HYPERGLYCEMIA, WITHOUT LONG-TERM CURRENT USE OF INSULIN: ICD-10-CM

## 2023-08-09 DIAGNOSIS — E11.69 TYPE 2 DIABETES MELLITUS WITH OTHER SPECIFIED COMPLICATION, WITHOUT LONG-TERM CURRENT USE OF INSULIN: Chronic | ICD-10-CM

## 2023-08-09 NOTE — TELEPHONE ENCOUNTER
----- Message from Frantz Costa sent at 8/9/2023  4:24 PM CDT -----  Contact: Josesito  .Type:  RX Refill Request    Who Called: Josesito  Refill or New Rx:refill   RX Name and Strength:metFORMIN (GLUCOPHAGE) 1000 MG tablet  How is the patient currently taking it? (ex. 1XDay):as prescribed  Is this a 30 day or 90 day RX:90  Preferred Pharmacy with phone number:.  [facility] [address] [address] [facility]  [address] [facility] [address]  Phone: [phone] Fax: [phone]    Local or Mail Order:Local   Ordering Provider:Dr. Monson   Would the patient rather a call back or a response via [facility]? Call   Best Call Back Number: .[phone]         Additional Information: reports sending in multiple request and pharmacy hasn't received as of yet. Please call into  to listed pharmacy,

## 2023-08-09 NOTE — TELEPHONE ENCOUNTER
No care due was identified.  [facility] Embedded Care Due Messages. Reference number: [id number].   8/09/2023 4:41:58 PM CDT

## 2023-08-11 ENCOUNTER — TELEPHONE (OUTPATIENT)
Dept: INTERNAL MEDICINE | Facility: CLINIC | Age: 43
End: 2023-08-11
Payer: COMMERCIAL

## 2023-08-11 NOTE — TELEPHONE ENCOUNTER
Received prescription refill request for metformin 1000 mg twice daily to [facility] [address] [facility]. After checking patient's chart, noted that last refill was September 2022, which means patient would have been out of the medication for months. Called patient to discuss, there was no answer, left voicemail.

## 2023-08-14 DIAGNOSIS — E11.69 TYPE 2 DIABETES MELLITUS WITH OTHER SPECIFIED COMPLICATION, WITHOUT LONG-TERM CURRENT USE OF INSULIN: Chronic | ICD-10-CM

## 2023-08-14 DIAGNOSIS — E11.65 TYPE 2 DIABETES MELLITUS WITH HYPERGLYCEMIA, WITHOUT LONG-TERM CURRENT USE OF INSULIN: ICD-10-CM

## 2023-08-14 RX ORDER — METFORMIN HYDROCHLORIDE 1000 MG/1
1000 TABLET ORAL 2 TIMES DAILY WITH MEALS
Qty: 180 TABLET | Refills: 0 | OUTPATIENT
Start: 2023-08-14

## 2023-08-14 RX ORDER — METFORMIN HYDROCHLORIDE 1000 MG/1
1000 TABLET ORAL 2 TIMES DAILY WITH MEALS
Qty: 180 TABLET | Refills: 0 | Status: SHIPPED | OUTPATIENT
Start: 2023-08-14 | End: 2023-08-31 | Stop reason: SDUPTHER

## 2023-08-14 NOTE — TELEPHONE ENCOUNTER
----- Message from Rosa Vincent sent at 8/14/2023  9:36 AM CDT -----  Contact: [phone]  Type:  Patient Returning Call    Who Called:Josesito   Who Left Message for Patient: Eddie   Does the patient know what this is regarding?:medication/metFORMIN (GLUCOPHAGE) 1000 MG tablet  Would the patient rather a call back or a response via [facility]? Call back   Best Call Back Number:[phone]   Additional Information: pt states he was told by Dr Monson that if he completed lab work the will be sent a small supply of medication until appt scheduled 8/31

## 2023-08-14 NOTE — TELEPHONE ENCOUNTER
No care due was identified.  [facility] Embedded Care Due Messages. Reference number: [id number].   8/14/2023 10:58:52 AM CDT

## 2023-08-14 NOTE — TELEPHONE ENCOUNTER
Refill Decision Note   Josesito Crabtree  is requesting a refill authorization.  Brief Assessment and Rationale for Refill:  Approve     Medication Therapy Plan:  A1C was done 8/3/23; LOV 10/11/22      Comments:     Note composed:11:09 AM 08/14/2023

## 2023-08-14 NOTE — TELEPHONE ENCOUNTER
Refill Decision Note   Josesito Crabtree  is requesting a refill authorization.  Brief Assessment and Rationale for Refill:  Quick Discontinue     Medication Therapy Plan:  Duplicate      Comments:     Note composed:11:07 AM 08/14/2023

## 2023-08-15 NOTE — PROGRESS NOTES
Results to be addressed at upcoming appointment(s) already scheduled.  Future Appointments  8/31/2023  8:00 AM    JR Monson MD      [facility]             [place]

## 2023-08-31 ENCOUNTER — OFFICE VISIT (OUTPATIENT)
Dept: INTERNAL MEDICINE | Facility: CLINIC | Age: 43
End: 2023-08-31
Payer: COMMERCIAL

## 2023-08-31 VITALS
DIASTOLIC BLOOD PRESSURE: 90 MMHG | OXYGEN SATURATION: 98 % | RESPIRATION RATE: 18 BRPM | HEIGHT: 74 IN | WEIGHT: 315 LBS | TEMPERATURE: 97 F | BODY MASS INDEX: 40.43 KG/M2 | HEART RATE: 92 BPM | SYSTOLIC BLOOD PRESSURE: 142 MMHG

## 2023-08-31 DIAGNOSIS — Z23 NEED FOR PNEUMOCOCCAL VACCINE: ICD-10-CM

## 2023-08-31 DIAGNOSIS — E11.59 HYPERTENSION COMPLICATING DIABETES: ICD-10-CM

## 2023-08-31 DIAGNOSIS — Z91.89 AT RISK FOR MEDICATION NONADHERENCE: Chronic | ICD-10-CM

## 2023-08-31 DIAGNOSIS — Z00.00 PREVENTATIVE HEALTH CARE: Primary | ICD-10-CM

## 2023-08-31 DIAGNOSIS — E11.69 DYSLIPIDEMIA ASSOCIATED WITH TYPE 2 DIABETES MELLITUS: Chronic | ICD-10-CM

## 2023-08-31 DIAGNOSIS — E66.01 MORBID OBESITY WITH BMI OF 45.0-49.9, ADULT: Chronic | ICD-10-CM

## 2023-08-31 DIAGNOSIS — I15.2 HYPERTENSION COMPLICATING DIABETES: ICD-10-CM

## 2023-08-31 DIAGNOSIS — E11.69 TYPE 2 DIABETES MELLITUS WITH OTHER SPECIFIED COMPLICATION, WITHOUT LONG-TERM CURRENT USE OF INSULIN: Chronic | ICD-10-CM

## 2023-08-31 DIAGNOSIS — E78.5 DYSLIPIDEMIA ASSOCIATED WITH TYPE 2 DIABETES MELLITUS: Chronic | ICD-10-CM

## 2023-08-31 PROBLEM — E11.9 TYPE 2 DIABETES MELLITUS, WITHOUT LONG-TERM CURRENT USE OF INSULIN: Chronic | Status: ACTIVE | Noted: 2023-08-31

## 2023-08-31 PROBLEM — E11.9 HYPERTENSION COMPLICATING DIABETES: Chronic | Status: ACTIVE | Noted: 2020-10-05

## 2023-08-31 PROCEDURE — 1160F PR REVIEW ALL MEDS BY PRESCRIBER/CLIN PHARMACIST DOCUMENTED: ICD-10-PCS | Mod: CPTII,S$GLB,, | Performed by: FAMILY MEDICINE

## 2023-08-31 PROCEDURE — 1160F RVW MEDS BY RX/DR IN RCRD: CPT | Mod: CPTII,S$GLB,, | Performed by: FAMILY MEDICINE

## 2023-08-31 PROCEDURE — 3077F SYST BP >= 140 MM HG: CPT | Mod: CPTII,S$GLB,, | Performed by: FAMILY MEDICINE

## 2023-08-31 PROCEDURE — 3077F PR MOST RECENT SYSTOLIC BLOOD PRESSURE >= 140 MM HG: ICD-10-PCS | Mod: CPTII,S$GLB,, | Performed by: FAMILY MEDICINE

## 2023-08-31 PROCEDURE — 99214 OFFICE O/P EST MOD 30 MIN: CPT | Mod: 25,S$GLB,, | Performed by: FAMILY MEDICINE

## 2023-08-31 PROCEDURE — 3061F PR NEG MICROALBUMINURIA RESULT DOCUMENTED/REVIEW: ICD-10-PCS | Mod: CPTII,S$GLB,, | Performed by: FAMILY MEDICINE

## 2023-08-31 PROCEDURE — 99999 PR PBB SHADOW E&M-EST. PATIENT-LVL V: CPT | Mod: PBBFAC,,, | Performed by: FAMILY MEDICINE

## 2023-08-31 PROCEDURE — 3008F BODY MASS INDEX DOCD: CPT | Mod: CPTII,S$GLB,, | Performed by: FAMILY MEDICINE

## 2023-08-31 PROCEDURE — 99396 PREV VISIT EST AGE 40-64: CPT | Mod: S$GLB,,, | Performed by: FAMILY MEDICINE

## 2023-08-31 PROCEDURE — 3080F DIAST BP >= 90 MM HG: CPT | Mod: CPTII,S$GLB,, | Performed by: FAMILY MEDICINE

## 2023-08-31 PROCEDURE — 3051F PR MOST RECENT HEMOGLOBIN A1C LEVEL 7.0 - < 8.0%: ICD-10-PCS | Mod: CPTII,S$GLB,, | Performed by: FAMILY MEDICINE

## 2023-08-31 PROCEDURE — 3061F NEG MICROALBUMINURIA REV: CPT | Mod: CPTII,S$GLB,, | Performed by: FAMILY MEDICINE

## 2023-08-31 PROCEDURE — 99214 PR OFFICE/OUTPT VISIT, EST, LEVL IV, 30-39 MIN: ICD-10-PCS | Mod: 25,S$GLB,, | Performed by: FAMILY MEDICINE

## 2023-08-31 PROCEDURE — 99396 PR PREVENTIVE VISIT,EST,40-64: ICD-10-PCS | Mod: S$GLB,,, | Performed by: FAMILY MEDICINE

## 2023-08-31 PROCEDURE — 3080F PR MOST RECENT DIASTOLIC BLOOD PRESSURE >= 90 MM HG: ICD-10-PCS | Mod: CPTII,S$GLB,, | Performed by: FAMILY MEDICINE

## 2023-08-31 PROCEDURE — 1159F PR MEDICATION LIST DOCUMENTED IN MEDICAL RECORD: ICD-10-PCS | Mod: CPTII,S$GLB,, | Performed by: FAMILY MEDICINE

## 2023-08-31 PROCEDURE — 3066F NEPHROPATHY DOC TX: CPT | Mod: CPTII,S$GLB,, | Performed by: FAMILY MEDICINE

## 2023-08-31 PROCEDURE — 3051F HG A1C>EQUAL 7.0%<8.0%: CPT | Mod: CPTII,S$GLB,, | Performed by: FAMILY MEDICINE

## 2023-08-31 PROCEDURE — 3066F PR DOCUMENTATION OF TREATMENT FOR NEPHROPATHY: ICD-10-PCS | Mod: CPTII,S$GLB,, | Performed by: FAMILY MEDICINE

## 2023-08-31 PROCEDURE — 99999 PR PBB SHADOW E&M-EST. PATIENT-LVL V: ICD-10-PCS | Mod: PBBFAC,,, | Performed by: FAMILY MEDICINE

## 2023-08-31 PROCEDURE — 3008F PR BODY MASS INDEX (BMI) DOCUMENTED: ICD-10-PCS | Mod: CPTII,S$GLB,, | Performed by: FAMILY MEDICINE

## 2023-08-31 PROCEDURE — 1159F MED LIST DOCD IN RCRD: CPT | Mod: CPTII,S$GLB,, | Performed by: FAMILY MEDICINE

## 2023-08-31 RX ORDER — VALSARTAN AND HYDROCHLOROTHIAZIDE 160; 12.5 MG/1; MG/1
1 TABLET, FILM COATED ORAL DAILY
Qty: 90 TABLET | Refills: 1 | Status: SHIPPED | OUTPATIENT
Start: 2023-08-31

## 2023-08-31 RX ORDER — METFORMIN HYDROCHLORIDE 1000 MG/1
1000 TABLET ORAL 2 TIMES DAILY WITH MEALS
Qty: 180 TABLET | Refills: 0 | Status: SHIPPED | OUTPATIENT
Start: 2023-08-31 | End: 2023-08-31 | Stop reason: SDUPTHER

## 2023-08-31 RX ORDER — METFORMIN HYDROCHLORIDE 1000 MG/1
1000 TABLET ORAL 2 TIMES DAILY WITH MEALS
Qty: 180 TABLET | Refills: 1 | Status: SHIPPED | OUTPATIENT
Start: 2023-08-31

## 2023-08-31 RX ORDER — TIRZEPATIDE 5 MG/.5ML
5 INJECTION, SOLUTION SUBCUTANEOUS
Qty: 4 PEN | Refills: 4 | Status: SHIPPED | OUTPATIENT
Start: 2023-09-28 | End: 2023-10-18

## 2023-08-31 RX ORDER — TIRZEPATIDE 2.5 MG/.5ML
2.5 INJECTION, SOLUTION SUBCUTANEOUS
Qty: 4 PEN | Refills: 0 | Status: SHIPPED | OUTPATIENT
Start: 2023-08-31 | End: 2023-09-30

## 2023-08-31 RX ORDER — ATORVASTATIN CALCIUM 20 MG/1
20 TABLET, FILM COATED ORAL NIGHTLY
Qty: 90 TABLET | Refills: 3 | Status: SHIPPED | OUTPATIENT
Start: 2023-08-31 | End: 2024-08-30

## 2023-08-31 NOTE — ASSESSMENT & PLAN NOTE
"Wt Readings from Last 6 Encounters:   08/31/23 (!) 172.2 kg (379 lb 10.1 oz)   10/19/22 (!) 173.8 kg (383 lb 2.6 oz)   10/11/22 (!) 169.7 kg (374 lb 1.9 oz)   07/27/22 (!) 171.6 kg (378 lb 5 oz)   04/07/22 (!) 171.9 kg (379 lb)   02/23/22 (!) 172.1 kg (379 lb 6.6 oz)     Estimated body mass index is 48.74 kg/m² as calculated from the following:    Height as of this encounter: 6' 2" (1.88 m).    Weight as of this encounter: 172.2 kg (379 lb 10.1 oz).    Therapeutic lifestyle changes encouraged. Recommended referral to Lif[facility] Wellness clinic.   "

## 2023-08-31 NOTE — LETTER
ATTN: CARE COORDINATION   NOTE: Do not send request until September 15.    PATIENT IDENTIFYING INFORMATION:  JOSESITO CRABTREE   [address] [facility] [facility] [address] YOB: 1980  OUR MRN: [id number]     RECORDS  REQUESTED FROM:      ADDRESS / PHONE:        REQUESTED DELIVERY METHOD FOR RECORDS: Fax ([phone]) or secure Email  to [email or web address]   DATES OF SERVICE: Specified below.     AUTHORIZATION:   For the purpose of continuity of care, I, Josesito Crabtree, hereby authorize the hospital, physician, or entity named above to release my medical records for all dates of service from 10 years prior to date signed through the present date to: JR Monson MD; [facility]; [id number] [place] [address]; [place] [address]; PH: [phone] SPECIFIC RECORDS REQUESTED:  [] Mammograms/breast imaging (last 5 years)  [] Pap tests and related tests (last 5 years)  [] colonoscopy & path reports or any other colorectal cancer screening tests (last 10 years)  [x] diabetic eye exam report (last 2 years)  [] PSA lab results (last 3 years)  [] DEXA/bone densitometry reports (last 5 years)  [] diabetes-related lab results (the 1 year)  [] record of immunizations/vaccinations (all)     In authorizing the release of the confidential information identified above, I hereby waive all restrictions or privileges imposed by law and release [facility] and Its affiliates and their staff from any restriction or privilege Imposed by law in connection with the disclosure or release of any professional record, observation or communication. I do understand that the information that is being released may be subject to re-disclosure by the recipient and may no longer be protected. I understand that my treatment, payment, enrollment or eligibility for benefits may not be conditioned on signing this authorization.  This authorization may be revoked in writing at any time, except to  the extent that [facility] and its affiliates have already taken action in reliance on it. Letters to revoke this authorization should be addressed to [facility], Release of Information Department, [address] [place] [address], [place] [address].     If not previously revoked in writing, this authorization will terminate or    26   months after the date signed below.                   Signature of Patient    Date Signed

## 2023-08-31 NOTE — ASSESSMENT & PLAN NOTE
Pharmacy records suggest poor medication adherence. We discussed strategies to help him overcome any barriers to adherence to medication regimen.   Health Care Proxy (HCP)

## 2023-08-31 NOTE — ASSESSMENT & PLAN NOTE
BP Readings from Last 6 Encounters:   08/31/23 (!) 142/90   10/19/22 (!) 144/98   10/11/22 (!) 126/96   07/27/22 130/70   02/23/22 132/84   12/02/21 120/70     Lab Results   Component Value Date    EGFRNORACEVR >60.0 08/03/2023    CREATININE 1.1 08/03/2023    BUN 14 08/03/2023    K 4.5 08/03/2023     08/03/2023     08/03/2023     Results for orders placed or performed during the hospital encounter of 10/07/20   SCHEDULED EKG 12-LEAD (to Muse)    Collection Time: 10/07/20  9:56 AM    Narrative    Test Reason : I10    Vent. Rate : 084 BPM     Atrial Rate : 084 BPM     P-R Int : 160 ms          QRS Dur : 090 ms      QT Int : 396 ms       P-R-T Axes : 064 044 068 degrees     QTc Int : 467 ms    Normal sinus rhythm  Nonspecific T wave abnormality  Abnormal ECG  No previous ECGs available  Confirmed by SARA VIERA MD ([id number]) on 10/8/2020 12:56:54 AM    Referred By: ARUN AGUIAR           Confirmed By:SARA VIERA MD

## 2023-08-31 NOTE — ASSESSMENT & PLAN NOTE
Lab Results   Component Value Date    CHOL 156 08/03/2023    CHOL 150 10/11/2022    TRIG 86 08/03/2023    TRIG 128 10/11/2022    HDL 38 (L) 08/03/2023    HDL 27 (L) 10/11/2022    LDLCALC 100.8 08/03/2023    LDLCALC 97.4 10/11/2022    NONHDLCHOL 118 08/03/2023    NONHDLCHOL 123 10/11/2022    AST 31 08/03/2023    ALT 55 (H) 08/03/2023     The 10-year ASCVD risk score (Lamonte BARILLAS, et al., 2019) is: 23.8%    Values used to calculate the score:      Age: 43 years      Sex: Male      Is Non- : Yes      Diabetic: Yes      Tobacco smoker: Yes      Systolic Blood Pressure: 142 mmHg      Is BP treated: Yes      HDL Cholesterol: 38 mg/dL      Total Cholesterol: 156 mg/dL

## 2023-08-31 NOTE — PROGRESS NOTES
"ANNUAL WELLNESS VISIT (PREVENTIVE SERVICES)  8/31/23  8:00 AM CDT    CHIEF COMPLAINT: Annual Exam    HEALTH MAINTENANCE INTERVENTIONS - UP TO DATE  Health Maintenance Topics with due status: Not Due       Topic Last Completion Date    TETANUS VACCINE 01/01/2017    Influenza Vaccine 10/25/2022    Diabetes Urine Screening 08/03/2023    Lipid Panel 08/03/2023    Hemoglobin A1c 08/03/2023    Low Dose Statin 08/31/2023     HEALTH MAINTENANCE INTERVENTIONS - DUE OR DUE SOON  Health Maintenance Due   Topic Date Due    Pneumococcal Vaccines (Age 0-64) (1 - PCV) Never done    Eye Exam  12/22/2021    COVID-19 Vaccine (4 - Pfizer risk series) 03/03/2022    Foot Exam  10/19/2023      Josesito is here for annual wellness and preventive services visit. He did not accept recommended vaccinations, citing a fear of needles. He says he has his exam scheduled in two weeks.    Josesito is also here for evaluation and management of unrelated problems reported separately.      1. Preventative health care    2. Need for pneumococcal vaccine    Review of Systems   Respiratory:  Negative for chest tightness and shortness of breath.    Cardiovascular:  Negative for chest pain.   Endocrine: Negative for polydipsia and polyuria.     Vitals:    08/31/23 0755   BP: (!) 142/90   BP Location: Right arm   Patient Position: Sitting   BP Method: Thigh Cuff (Manual)   Pulse: 92   Resp: 18   Temp: 97 °F (36.1 °C)   SpO2: 98%   Weight: (!) 172.2 kg (379 lb 10.1 oz)   Height: 6' 2" (1.88 m)   Physical Exam  Vitals reviewed.   Constitutional:       General: He is not in acute distress.     Appearance: Normal appearance. He is not ill-appearing or diaphoretic.   Cardiovascular:      Rate and Rhythm: Normal rate and regular rhythm.   Pulmonary:      Effort: Pulmonary effort is normal.      Breath sounds: Normal breath sounds.   Skin:     General: Skin is warm and dry.   Neurological:      Mental Status: He is alert and oriented to person, place, and time. " "Mental status is at baseline.   Psychiatric:         Mood and Affect: Mood normal.         Behavior: Behavior normal.         Judgment: Judgment normal.       Documentation entered by me for this encounter may have been done in part using speech-recognition technology. Although I have made an effort to ensure accuracy, "sound like" errors may exist and should be interpreted in context.    ADDITIONAL E&M SERVICES PROVIDED - UNRELATED TO PREVENTIVE SERVICES  8/31/23  8:00 AM CDT    In addition to and unrelated to the preventive services provided this date, the following condition(s) were also evaluated and managed.    CHIEF COMPLAINT: Weight.    HPI: He's frustrated by his inability to lose weight in spite of efforts at therapeutic lifestyle changes. He says that he does not want to consider bariatric surgical option. We discussed risks and benefits of treatment options. No history of allergy or hypersensitivity to GLP-1 Agonists. No history or family history of thyroid cancer or multiple endocrine neoplasia syndrome. His blood pressure is not controlled, but he acknowledges that he has not been taking his blood pressure medication consistently. No particular reason. Likewise, his adherence to his medication's for diabetes and hyperlipidemia has been poor. We discussed strategies to help him overcome any barriers to adherence to medication regimen.    PHYSICAL EXAM  Vitals:    08/31/23 0755   BP: (!) 142/90   BP Location: Right arm   Patient Position: Sitting   BP Method: Thigh Cuff (Manual)   Pulse: 92   Resp: 18   Temp: 97 °F (36.1 °C)   SpO2: 98%   Weight: (!) 172.2 kg (379 lb 10.1 oz)   Height: 6' 2" (1.88 m)     Physical Exam  Vitals reviewed.   Constitutional:       General: He is not in acute distress.     Appearance: Normal appearance. He is not ill-appearing or diaphoretic.   Cardiovascular:      Rate and Rhythm: Normal rate and regular rhythm.   Pulmonary:      Effort: Pulmonary effort is normal.      Breath " "sounds: Normal breath sounds.   Skin:     General: Skin is warm and dry.   Neurological:      Mental Status: He is alert and oriented to person, place, and time. Mental status is at baseline.   Psychiatric:         Mood and Affect: Mood normal.         Behavior: Behavior normal.         Judgment: Judgment normal.       DIABETIC FOOT EXAM:  Protective Sensation (w/ 10 gram monofilament):  Right: Intact  Left: Intact    Visual Inspection:  Normal -  Bilateral    Pedal Pulses:   Right: Present  Left: Present    Posterior Tibialis Pulses:   Right:Present  Left: Present     Schedule Nurse Visit blood pressure check around end of September.  Schedule Nurse Visit BP and Weight check in mid-November.  Schedule VV with me a day or two after that last Nurse Visit.  Print and execute EMILI routed to My Staff, and highlight "NOTE: Do not send request until September 15."  Highlight patient instructions on AVS.  Follow up in about 3 months (around 11/30/2023) for virtual visit, re-evaluation.  Documentation entered by me for this encounter may have been done in part using speech-recognition technology. Although I have made an effort to ensure accuracy, "sound like" errors may exist and should be interpreted in context.    "

## 2023-08-31 NOTE — PATIENT INSTRUCTIONS
I have referred you to our excellent Lifestyle, Wellness, and Weight Management Program here at [facility]. The , Dr. Ángela Dowling, is top notch. You will like Dr. Dowling, and she will take good care of you.    The mission of the program is to promote a healthy lifestyle and overall wellness, particularly assisting individuals who are overweight and obese in achieving a healthier weight.    Comprehensive Approach  They advocate for a comprehensive approach to weight management that encompasses healthy eating, consistent physical activity, and when necessary, weight loss medication. Their skilled team collaborates with you to develop a personalized plan accommodating your needs, preferences, and lifestyle.    Healthy Eating and Nutrition  At the heart of their program is a strong emphasis on healthy eating and nutrition. Their seasoned nutritionists provide guidance, support, and education to aid you in making healthier food choices and developing enduring healthy habits. They work with you with you to devise meal plans that are satisfying, balanced, and tailored to your unique needs.    Physical Activity  Regular physical activity is critical to achieving and maintaining a healthy weight. Their program offers basic exercise plans compatible with your current fitness level and personal goals. They'll help you find activities that you enjoy, making it easier to remain committed and motivated.    Weight Loss Medication  When appropriate, they may suggest medication to complement your healthy eating and exercise efforts. Their team will carefully evaluate your medical history, present health status, and weight loss ambitions to decide if medication is appropriate for you. If prescribed, they'll closely supervise your progress and adjust your treatment plan as needed.    Join Them on Your Wellness Journey  Starting on a path to a healthier you can be demanding, but you don't  have to face it alone. The dedicated team at the Lifestyle and Wellness Program is available to offer you the support, guidance, and expertise you need to succeed, the goal being a happier, healthier, and more fulfilling life.    Someone from their office should be contacting you soon (by phone or by [facility] message) to help schedule your appointment. If you haven't been contacted within the next 3-4 business days, call [facility] [place] appointment desk ([phone]).

## 2023-09-05 ENCOUNTER — TELEPHONE (OUTPATIENT)
Dept: PRIMARY CARE CLINIC | Facility: CLINIC | Age: 43
End: 2023-09-05
Payer: COMMERCIAL

## 2023-10-08 ENCOUNTER — HOSPITAL ENCOUNTER (EMERGENCY)
Facility: HOSPITAL | Age: 43
Discharge: HOME | End: 2023-10-08
Attending: EMERGENCY MEDICINE
Payer: COMMERCIAL

## 2023-10-08 VITALS
TEMPERATURE: 99 F | WEIGHT: 315 LBS | SYSTOLIC BLOOD PRESSURE: 167 MMHG | OXYGEN SATURATION: 99 % | HEART RATE: 95 BPM | BODY MASS INDEX: 48.35 KG/M2 | RESPIRATION RATE: 18 BRPM | DIASTOLIC BLOOD PRESSURE: 94 MMHG

## 2023-10-08 DIAGNOSIS — M54.2 NECK PAIN: ICD-10-CM

## 2023-10-08 PROCEDURE — 99283 EMERGENCY DEPT VISIT LOW MDM: CPT

## 2023-10-08 PROCEDURE — 25000003 PHARM REV CODE 250: Performed by: NURSE PRACTITIONER

## 2023-10-08 RX ORDER — HYDROCODONE BITARTRATE AND ACETAMINOPHEN 5; 325 MG/1; MG/1
1 TABLET ORAL EVERY 6 HOURS PRN
Qty: 12 TABLET | Refills: 0 | Status: SHIPPED | OUTPATIENT
Start: 2023-10-08

## 2023-10-08 RX ORDER — CYCLOBENZAPRINE HCL 5 MG
10 TABLET ORAL
Status: COMPLETED | OUTPATIENT
Start: 2023-10-08 | End: 2023-10-08

## 2023-10-08 RX ADMIN — CYCLOBENZAPRINE HYDROCHLORIDE 10 MG: 5 TABLET, FILM COATED ORAL at 03:10

## 2023-10-09 NOTE — ED PROVIDER NOTES
"Encounter Date: 10/8/2023       History     Chief Complaint   Patient presents with    Neck Pain     Pt reports neck "spasms" x3 weeks. Denies trauma/injury     The history is provided by the patient. No  was used.   Neck Pain   This is a new problem. The current episode started several weeks ago. The problem occurs constantly. The problem has been unchanged. The pain is associated with nothing. The pain is present in the generalized neck. The quality of the pain is described as aching. The pain is at a severity of 4/10. The symptoms are aggravated by bending, twisting and position. Pertinent negatives include no photophobia, no visual change, no chest pain, no syncope, no numbness, no weight loss, no headaches, no bowel incontinence, no bladder incontinence, no leg pain, no paresis, no tingling and no weakness.     Review of patient's allergies indicates:  No Known Allergies  Past Medical History:   Diagnosis Date    Essential hypertension 10/5/2020    Primary localized osteoarthritis of knees, bilateral 2/23/2022    Type 2 diabetes mellitus with microalbuminuria, without long-term current use of insulin 12/18/2021    Type 2 diabetes mellitus with other specified complication 10/19/2020     Past Surgical History:   Procedure Laterality Date    KNEE SURGERY Left 1996/1997     Family History   Problem Relation Age of Onset    No Known Problems Mother     No Known Problems Father      Social History     Tobacco Use    Smoking status: Every Day     Current packs/day: 0.50     Types: Cigarettes    Smokeless tobacco: Never   Substance Use Topics    Alcohol use: Yes    Drug use: Never     Review of Systems   Constitutional:  Negative for fever and weight loss.   HENT:  Negative for sore throat.    Eyes:  Negative for photophobia.   Respiratory:  Negative for shortness of breath.    Cardiovascular:  Negative for chest pain and syncope.   Gastrointestinal:  Negative for abdominal pain, bowel " incontinence, nausea and vomiting.   Genitourinary:  Negative for bladder incontinence and dysuria.   Musculoskeletal:  Positive for neck pain. Negative for back pain.   Skin:  Negative for rash.   Neurological:  Negative for tingling, weakness, numbness and headaches.   Hematological:  Does not bruise/bleed easily.       Physical Exam     Initial Vitals   BP Pulse Resp Temp SpO2   10/08/23 1419 10/08/23 1418 10/08/23 1418 10/08/23 1418 10/08/23 1418   (!) 167/94 95 18 98.7 °F (37.1 °C) 99 %      MAP       --                Physical Exam    Nursing note and vitals reviewed.  Constitutional: He appears well-developed and well-nourished. He is not diaphoretic. No distress.   HENT:   Head: Normocephalic and atraumatic.   Eyes: Right eye exhibits no discharge. Left eye exhibits no discharge.   Neck: Neck supple.   Normal range of motion.  Cardiovascular:  Normal rate.           Pulmonary/Chest: No respiratory distress.   Abdominal: He exhibits no distension.   Musculoskeletal:         General: Normal range of motion.      Cervical back: Normal range of motion and neck supple.     Neurological: He is alert and oriented to person, place, and time. He has normal strength.   Skin: Skin is warm and dry.   Psychiatric: He has a normal mood and affect. His behavior is normal. Thought content normal.         ED Course   Procedures  Labs Reviewed - No data to display       Imaging Results              X-Ray Cervical Spine AP And Lateral (Final result)  Result time 10/08/23 16:57:27      Final result by Lio Son MD (10/08/23 16:57:27)                   Impression:      As above      Electronically signed by: Lio Son  Date:    10/08/2023  Time:    16:57               Narrative:    EXAMINATION:  XR CERVICAL SPINE AP LATERAL    CLINICAL HISTORY:  XR CERVICAL SPINE AP LATERALCervicalgia    COMPARISON:  None    FINDINGS:  Multiple radiographic views  were obtained.    No evidence of acute fracture or dislocation.   Prominent stylomastoid process.  Mild moderate degenerative joint disease                                       Medications   cyclobenzaprine tablet 10 mg (10 mg Oral Given 10/8/23 4147)     Medical Decision Making  Amount and/or Complexity of Data Reviewed  Radiology: ordered.    Risk  Prescription drug management.                  I discussed with patient that the evaluation in the emergency department does not suggest any emergent or life threatening medical condition requiring immediate intervention beyond what was provided in the ED, and I believe patient is safe for discharge.  Regardless, an unremarkable evaluation in the ED does not preclude the development or presence of a serious of life threatening condition. As such, patient was instructed to return immediately for any worsening or change in current symptoms. I also discussed the results of my evaluation and diagnostics with patient and he concurs with the evaluation and management plan.  Detailed written and verbal instructions provided to patient and he expressed a verbal understanding of the discharge instructions and overall management plan. Reiterated the importance of medication administration and safety and advised patient to follow up with primary care provider in 3-5 days or sooner if needed.  Also advised patient to return to the ER for any complications.                  Clinical Impression:   Final diagnoses:  [M54.2] Neck pain        ED Disposition Condition    Discharge Stable          ED Prescriptions       Medication Sig Dispense Start Date End Date Auth. Provider    HYDROcodone-acetaminophen (NORCO) 5-325 mg per tablet Take 1 tablet by mouth every 6 (six) hours as needed for Pain. 12 tablet 10/8/2023 -- Michael aJcobs NP          Follow-up Information       Follow up With Specialties Details Why Contact Info    pcp of choice   As needed     [facility] Emergency Dept. Emergency Medicine  If symptoms worsen [id number] [facility]  [place] [id number]  [phone]             Michael Jacobs, NP  10/09/23 1100

## 2023-10-18 ENCOUNTER — OFFICE VISIT (OUTPATIENT)
Dept: INTERNAL MEDICINE | Facility: CLINIC | Age: 43
End: 2023-10-18
Payer: COMMERCIAL

## 2023-10-18 VITALS
WEIGHT: 315 LBS | OXYGEN SATURATION: 96 % | HEIGHT: 74 IN | DIASTOLIC BLOOD PRESSURE: 92 MMHG | HEART RATE: 87 BPM | BODY MASS INDEX: 40.43 KG/M2 | RESPIRATION RATE: 20 BRPM | SYSTOLIC BLOOD PRESSURE: 158 MMHG

## 2023-10-18 DIAGNOSIS — E11.65 TYPE 2 DIABETES MELLITUS WITH HYPERGLYCEMIA, WITHOUT LONG-TERM CURRENT USE OF INSULIN: Chronic | ICD-10-CM

## 2023-10-18 DIAGNOSIS — E11.59 HYPERTENSION COMPLICATING DIABETES: Chronic | ICD-10-CM

## 2023-10-18 DIAGNOSIS — M47.22 OSTEOARTHRITIS OF SPINE WITH RADICULOPATHY, CERVICAL REGION: Primary | ICD-10-CM

## 2023-10-18 DIAGNOSIS — I15.2 HYPERTENSION COMPLICATING DIABETES: Chronic | ICD-10-CM

## 2023-10-18 DIAGNOSIS — E66.01 MORBID OBESITY WITH BMI OF 45.0-49.9, ADULT: Chronic | ICD-10-CM

## 2023-10-18 PROCEDURE — 1160F PR REVIEW ALL MEDS BY PRESCRIBER/CLIN PHARMACIST DOCUMENTED: ICD-10-PCS | Mod: CPTII,S$GLB,, | Performed by: FAMILY MEDICINE

## 2023-10-18 PROCEDURE — 3066F PR DOCUMENTATION OF TREATMENT FOR NEPHROPATHY: ICD-10-PCS | Mod: CPTII,S$GLB,, | Performed by: FAMILY MEDICINE

## 2023-10-18 PROCEDURE — 3051F HG A1C>EQUAL 7.0%<8.0%: CPT | Mod: CPTII,S$GLB,, | Performed by: FAMILY MEDICINE

## 2023-10-18 PROCEDURE — 1160F RVW MEDS BY RX/DR IN RCRD: CPT | Mod: CPTII,S$GLB,, | Performed by: FAMILY MEDICINE

## 2023-10-18 PROCEDURE — 99214 OFFICE O/P EST MOD 30 MIN: CPT | Mod: S$GLB,,, | Performed by: FAMILY MEDICINE

## 2023-10-18 PROCEDURE — 1159F PR MEDICATION LIST DOCUMENTED IN MEDICAL RECORD: ICD-10-PCS | Mod: CPTII,S$GLB,, | Performed by: FAMILY MEDICINE

## 2023-10-18 PROCEDURE — 3077F SYST BP >= 140 MM HG: CPT | Mod: CPTII,S$GLB,, | Performed by: FAMILY MEDICINE

## 2023-10-18 PROCEDURE — 3008F BODY MASS INDEX DOCD: CPT | Mod: CPTII,S$GLB,, | Performed by: FAMILY MEDICINE

## 2023-10-18 PROCEDURE — 99214 PR OFFICE/OUTPT VISIT, EST, LEVL IV, 30-39 MIN: ICD-10-PCS | Mod: S$GLB,,, | Performed by: FAMILY MEDICINE

## 2023-10-18 PROCEDURE — 3061F NEG MICROALBUMINURIA REV: CPT | Mod: CPTII,S$GLB,, | Performed by: FAMILY MEDICINE

## 2023-10-18 PROCEDURE — 1159F MED LIST DOCD IN RCRD: CPT | Mod: CPTII,S$GLB,, | Performed by: FAMILY MEDICINE

## 2023-10-18 PROCEDURE — 3008F PR BODY MASS INDEX (BMI) DOCUMENTED: ICD-10-PCS | Mod: CPTII,S$GLB,, | Performed by: FAMILY MEDICINE

## 2023-10-18 PROCEDURE — 99999 PR PBB SHADOW E&M-EST. PATIENT-LVL IV: ICD-10-PCS | Mod: PBBFAC,,, | Performed by: FAMILY MEDICINE

## 2023-10-18 PROCEDURE — 3080F PR MOST RECENT DIASTOLIC BLOOD PRESSURE >= 90 MM HG: ICD-10-PCS | Mod: CPTII,S$GLB,, | Performed by: FAMILY MEDICINE

## 2023-10-18 PROCEDURE — 3066F NEPHROPATHY DOC TX: CPT | Mod: CPTII,S$GLB,, | Performed by: FAMILY MEDICINE

## 2023-10-18 PROCEDURE — 3077F PR MOST RECENT SYSTOLIC BLOOD PRESSURE >= 140 MM HG: ICD-10-PCS | Mod: CPTII,S$GLB,, | Performed by: FAMILY MEDICINE

## 2023-10-18 PROCEDURE — 3051F PR MOST RECENT HEMOGLOBIN A1C LEVEL 7.0 - < 8.0%: ICD-10-PCS | Mod: CPTII,S$GLB,, | Performed by: FAMILY MEDICINE

## 2023-10-18 PROCEDURE — 3080F DIAST BP >= 90 MM HG: CPT | Mod: CPTII,S$GLB,, | Performed by: FAMILY MEDICINE

## 2023-10-18 PROCEDURE — 99999 PR PBB SHADOW E&M-EST. PATIENT-LVL IV: CPT | Mod: PBBFAC,,, | Performed by: FAMILY MEDICINE

## 2023-10-18 PROCEDURE — 3061F PR NEG MICROALBUMINURIA RESULT DOCUMENTED/REVIEW: ICD-10-PCS | Mod: CPTII,S$GLB,, | Performed by: FAMILY MEDICINE

## 2023-10-18 RX ORDER — MELOXICAM 15 MG/1
15 TABLET ORAL DAILY PRN
Qty: 30 TABLET | Refills: 0 | Status: SHIPPED | OUTPATIENT
Start: 2023-10-18

## 2023-10-18 RX ORDER — METHOCARBAMOL 750 MG/1
750 TABLET, FILM COATED ORAL 3 TIMES DAILY PRN
Qty: 30 TABLET | Refills: 0 | Status: SHIPPED | OUTPATIENT
Start: 2023-10-18 | End: 2023-10-28

## 2023-10-18 NOTE — PROGRESS NOTES
Subjective:       Patient ID: Josesito Crabtree is a 43 y.o. male.    Chief Complaint: Neck Pain    43-year-old  male patient of Dr. Monson with Patient Active Problem List:     Morbid obesity with BMI of 45.0-49.9, adult     Hypertension complicating diabetes     Cigarette nicotine dependence, uncomplicated     Bilateral chronic knee pain     Acanthosis nigricans     Obstructive sleep apnea (AHI = 21)     Chronic venous hypertension involving both sides     Type 2 diabetes mellitus with hyperglycemia, without long-term current use of insulin     Dyslipidemia associated with type 2 diabetes mellitus     Ganglion cyst of volar aspect of left wrist     Type 2 diabetes mellitus with microalbuminuria, without long-term current use of insulin     Primary localized osteoarthritis of knees, bilateral     Primary localized osteoarthritis of ankles, bilateral     Onychomycosis of multiple toenails with type 2 diabetes mellitus     Type 2 diabetes mellitus, without long-term current use of insulin     At risk for medication nonadherence  Reports that he has been having neck pain off and on lately for the past 1 month, reports pain up to 9/10 and has been to emergency room and had x-rays done showing arthritis changes  Patient was prescribed Norco but no significant relief noted  Denies any tingling or numbness sensation to extremities  Blood glucose levels has been stable  Has not been taking his blood pressure medication lately      Review of Systems   Constitutional:  Negative for appetite change and fatigue.   Eyes:  Negative for visual disturbance.   Respiratory:  Negative for shortness of breath.    Cardiovascular:  Negative for chest pain, palpitations and leg swelling.   Gastrointestinal:  Negative for abdominal pain, nausea and vomiting.   Musculoskeletal:  Positive for myalgias and neck pain.   Skin:  Negative for rash.   Neurological:  Negative for weakness, numbness and headaches.  "  Psychiatric/Behavioral:  Negative for sleep disturbance.          BP (!) 158/92 (BP Location: Right arm, Patient Position: Sitting, BP Method: Thigh Cuff (Manual))   Pulse 87   Resp 20   Ht 6' 2" (1.88 m)   Wt (!) 168.5 kg (371 lb 7.6 oz)   SpO2 96%   BMI 47.69 kg/m²   Objective:      Physical Exam  Constitutional:       Appearance: He is well-developed.   HENT:      Head: Normocephalic and atraumatic.   Cardiovascular:      Rate and Rhythm: Normal rate and regular rhythm.      Heart sounds: Normal heart sounds. No murmur heard.  Pulmonary:      Effort: Pulmonary effort is normal.      Breath sounds: Normal breath sounds. No wheezing.   Abdominal:      General: Bowel sounds are normal.      Palpations: Abdomen is soft.      Tenderness: There is no abdominal tenderness.   Musculoskeletal:         General: Tenderness present.      Comments: Positive for paraspinal cervical muscle tenderness noted bilaterally  Hand  2+ bilaterally   Skin:     General: Skin is warm and dry.      Findings: No rash.   Neurological:      Mental Status: He is alert and oriented to person, place, and time.   Psychiatric:         Mood and Affect: Mood normal.         X-Ray Cervical Spine AP And Lateral  Narrative: EXAMINATION:  XR CERVICAL SPINE AP LATERAL    CLINICAL HISTORY:  XR CERVICAL SPINE AP LATERALCervicalgia    COMPARISON:  None    FINDINGS:  Multiple radiographic views  were obtained.    No evidence of acute fracture or dislocation.  Prominent stylomastoid process.  Mild moderate degenerative joint disease  Impression: As above    Electronically signed by: Lio Son  Date:    10/08/2023  Time:    16:57     Assessment/Plan:   1. Osteoarthritis of spine with radiculopathy, cervical region  -     meloxicam (MOBIC) 15 MG tablet; Take 1 tablet (15 mg total) by mouth daily as needed.  Dispense: 30 tablet; Refill: 0  -     methocarbamoL (ROBAXIN) 750 MG Tab; Take 1 tablet (750 mg total) by mouth 3 (three) times daily as " needed.  Dispense: 30 tablet; Refill: 0  -     Ambulatory referral/consult to Physical/Occupational Therapy; Future; Expected date: 10/25/2023  Meloxicam and methocarbamol Has been started today for symptomatic relief  Will refer to physical therapy  If no improvement in 4-6 weeks patient to consider pain management    2. Hypertension complicating diabetes  Blood pressure is elevated for not taking his medications, encouraged to get back started taking valsartan hydrochlorothiazide 160/12.5 mg  Patient reports that he has it at home  Encouraged to monitor blood pressure trends and if remains elevated above 140/90 patient to contact PCP    3. Type 2 diabetes mellitus with hyperglycemia, without long-term current use of insulin  Stable on metformin 1000 mg twice daily    4. Morbid obesity with BMI of 45.0-49.9, adult  Lifestyle modifications discussed to lose weight with BMI 47

## 2023-10-23 ENCOUNTER — CLINICAL SUPPORT (OUTPATIENT)
Dept: REHABILITATION | Facility: HOSPITAL | Age: 43
End: 2023-10-23
Payer: COMMERCIAL

## 2023-10-23 DIAGNOSIS — M54.2 NECK PAIN: ICD-10-CM

## 2023-10-23 DIAGNOSIS — M47.22 OSTEOARTHRITIS OF SPINE WITH RADICULOPATHY, CERVICAL REGION: ICD-10-CM

## 2023-10-23 DIAGNOSIS — R29.898 DECREASED ROM OF NECK: ICD-10-CM

## 2023-10-23 PROCEDURE — 97112 NEUROMUSCULAR REEDUCATION: CPT | Mod: PN

## 2023-10-23 PROCEDURE — 97140 MANUAL THERAPY 1/> REGIONS: CPT | Mod: PN

## 2023-10-23 PROCEDURE — 97161 PT EVAL LOW COMPLEX 20 MIN: CPT | Mod: PN

## 2023-10-23 NOTE — PLAN OF CARE
[facility]  Physical Therapy Initial Evaluation    Name: Josesito Crabtree  Clinic Number: [id number]    Therapy Diagnosis:   Encounter Diagnoses   Name Primary?    Osteoarthritis of spine with radiculopathy, cervical region     Decreased ROM of neck     Neck pain      Physician: Nancy Neil MD    Physician Orders: PT Eval and Treat   Medical Diagnosis from Referral: M47.22 (ICD-10-CM) - Osteoarthritis of spine with radiculopathy, cervical region  Evaluation Date: 10/23/2023  Authorization Period Expiration: 12/31/23  Plan of Care Expiration: 1/1/23  Visit # / Visits authorized: 1/ 1  Foto: 1/3    Precautions: Standard, HTN, DMII    Time In: 9:42 am  Time Out: 10:38 am  Total Time: 56 minutes      Subjective   Date of onset: about 1.5  months ago  History of current condition - Josesito reports: neck pain arose out of no where. Pt states it was mostly stiffness at this time but felt like it was cramping. Shooting to temporals B with a headache like response. Pt states he was taking medication regularly but that eased and he is now using it intermittently.     Pain:  Current 7/10, worst 10/10, best 5/10   Location: neck and shoulders  Description: sore, ache, and stiffness  Aggravating Factors:staying still, sleeping, gardening  Easing Factors: massage, medication    Prior Therapy: yes  Social History: lives home with family  Occupation: mental health counselor  Prior Level of Function: gardening, shooting pool and shoot guns and lots of traveling; video brissa  Current Level of Function: limited in gardening and shooting due to pain; pain with video brissa    Imaging: none    Medical History:   Past Medical History:   Diagnosis Date    Essential hypertension 10/5/2020    Primary localized osteoarthritis of knees, bilateral 2/23/2022    Type 2 diabetes mellitus with microalbuminuria, without long-term current use of insulin 12/18/2021    Type 2 diabetes mellitus with other specified  complication 10/19/2020       Surgical History:   Josesito Crabtree  has a past surgical history that includes Knee surgery (Left, 1996/1997).    Medications:   Josesito has a current medication list which includes the following prescription(s): atorvastatin, blood sugar diagnostic, blood-glucose meter, hydrocodone-acetaminophen, lancets, meloxicam, metformin, methocarbamol, onetouch delica plus lancet, onetouch ultra2 meter, and valsartan-hydrochlorothiazide.    Allergies:   Review of patient's allergies indicates:  No Known Allergies     Pts goals: less pain with gardening and sleeping    Objective       CMS Impairment/Limitation/Restriction for FOTO neck Survey    Therapist reviewed FOTO scores for Josesito Crabtree on 10/23/2023.   FOTO documents entered into [employer] - see Media section.    Functional Score: 61       Headaches are daily to the temporal area B.    Posture: Pt noted to present with moderate forward head/rounded shoulder posture.  B scapula elevated and protracted, reduced thoracic kyphosis.     Shoulder ROM:    Active/Passive Joint Range Right Left   Flexion 180 180   ABDuction 180 180   External Rotation 80 80   Internal Rotation T 10 functionally B T 10 functionally B   Adduction 30 30   Extension 60 75     Pain with motions of B shoulder flexion and internal rotation .    Cervical Spine AROM:   Degrees Pain   Flexion 40    Extend 38 p   R side bend 35 p   L side bend 40 p   R rotation 50 p   L rotation 50 p     Strength:  Muscle (Myotome) Right Left   Cervical Deep neck Flexor hold time 16 sec    Shoulder Abduction 4 5   Elbow Flexors (C5) 5 5   Wrist Extensors (C6) 5 5   Elbow Extensors (C7) 5 5   ER (arm at side) 4 at 0 and 90 5   IR (arm at side) 4 5   Thumb extensors 5 5   Intrinsic strength 5 5     Sensation: Intact      Special Test:  Persaud -  Denisha  -     AC crossover -         Upper Limb Tension Test: -    Palpation:   Patient tender with increased tone over B UT R>L, levator scap, infraspinatus,  post cervical mm, SCM, and  med/lateral scapular muscles. .    TREATMENT   Treatment Time In: 10:00 am  Treatment Time Out: 10:30 am  Total Treatment time separate from Evaluation: 30 minutes    Josesito received the following manual therapy techniques: Manual traction and Soft tissue Mobilization were applied to the: neck and scapula for 15 minutes, including:  Suboccipital release  Soft tissue mobs to the infraspinatus R, upper trap R and levator and medial scapular border R    Josesito participated in neuromuscular re-education activities to improve: Balance, Proprioception, and Posture for 15 minutes. The following activities were included:    Supine chin tuck 2x 10  Supine retraction 1x 10  Supine chin tuck with retraction 2x 10 B  Supine scapular retract/protract 2x 15    To be added   Land mines, scapular retract/depress, shoulder external rotation, rows with scapular retraction, wall press up and Prone T  B      Home Exercises and Patient Education Provided    Education provided:   -Education on condition, HEP, and PT educated pt in deconditioning noted in shoulder muscles and scapular stabilizers as well as tension in the suboccipitals.    Written Home Exercises Provided: yes.  Exercises were reviewed and Josesito was able to demonstrate them prior to the end of the session.  Josesito demonstrated good  understanding of the education provided.     See EMR under Patient Instructions for exercises provided 10/23/2023.    Assessment   Josesito is a 43 y.o. male referred to outpatient Physical Therapy with a medical diagnosis of M47.22 (ICD-10-CM) - Osteoarthritis of spine with radiculopathy, cervical region, pt presents with signs and symptoms consistent with diagnosis along with neck ROM loss, neck pain. The patient presents with impairments which include impairments list: ROM, strength, endurance, joint mobility, muscle length, balance, posture, and functional movement patterns.  These impairments are limiting  patient's ability to reach, lift, turn the head and sleep well.     Pt prognosis is Good due to personal factors and co-morbidities listed below.   Pt will benefit from skilled outpatient Physical Therapy to address the deficits stated above and in the chart below, provide pt/family education, and to maximize pt's level of independence.     Plan of care discussed with patient: Yes  Pt's spiritual, cultural and educational needs considered and patient is agreeable to the plan of care and goals as stated below:     Anticipated Barriers for therapy: deconditioned,, sits often for home and work tasks, poor nutrition      Medical Necessity is demonstrated by the following  History  Co-morbidities and personal factors that may impact the plan of care [] LOW: no personal factors / co-morbidities  [x] MODERATE: 1-2 personal factors / co-morbidities  [] HIGH: 3+ personal factors / co-morbidities    Moderate / High Support Documentation:   Co-morbidities affecting plan of care: pre DMII, HTN, not sleeping well    Personal Factors:   lifestyle     Examination  Body Structures and Functions, activity limitations and participation restrictions that may impact the plan of care [x] LOW: addressing 1-2 elements  [] MODERATE: 3+ elements  [] HIGH: 4+ elements (please support below)    Moderate / High Support Documentation: na     Clinical Presentation [x] LOW: stable  [] MODERATE: Evolving  [] HIGH: Unstable     Decision Making/ Complexity Score: low         Goals:  Short Term Goals: In 4 weeks   1.Pt to be educated on HEP.  2.Patient to increase GH ROM T7 with R shoulder internal rotation    3.Patient to reduce headaches to 2x a week max  4.Patient to have pain less than 4/10 at all times.  5.Patient to improve score on the FOTO by 10%.  6. Pt to be educated on postural and body mechanics awareness.    Long Term Goals: In 10 weeks 1/1/23  1. Patient to improve score on the FOTO to 75.  2. Patient to demo increase in UE strength to  5/5 with external rotation  3. Patient to have decreased pain to 2/10 or better at all times.  4. Patient to demo increase cervical extension to 70 and rotation to 80 or better  5. Patient to perform daily activities including lifting up to 15 # overhead and dead lifting up to 60 # for gardening tasks.      Plan   Plan of care Certification: 10/23/2023 to 1/1/23.    Outpatient Physical Therapy 2 times weekly for 10 weeks to include the following interventions: Cervical/Lumbar Traction, Electrical Stimulation IFC, NMES, Gait Training, Manual Therapy, Moist Heat/ Ice, Neuromuscular Re-ed, Patient Education, Self Care, Therapeutic Activities, Therapeutic Exercise, and DN.     Diane Cheng, PT, CIDN, SFMA    Thank you for this referral.    These services are reasonable and necessary for the conditions set forth above while under my care.

## 2023-10-23 NOTE — PROGRESS NOTES
[facility]  Physical Therapy Initial Evaluation    Name: Josesito Crabtree  Clinic Number: [id number]    Therapy Diagnosis:   Encounter Diagnoses   Name Primary?    Osteoarthritis of spine with radiculopathy, cervical region     Decreased ROM of neck     Neck pain      Physician: Nancy Neil MD    Physician Orders: PT Eval and Treat   Medical Diagnosis from Referral: M47.22 (ICD-10-CM) - Osteoarthritis of spine with radiculopathy, cervical region  Evaluation Date: 10/23/2023  Authorization Period Expiration: 12/31/23  Plan of Care Expiration: 1/1/23  Visit # / Visits authorized: 1/ 1  Foto: 1/3    Precautions: Standard, HTN, DMII    Time In: 9:42 am  Time Out: 10:38 am  Total Time: 56 minutes      Subjective   Date of onset: about 1.5  months ago  History of current condition - Josesito reports: neck pain arose out of no where. Pt states it was mostly stiffness at this time but felt like it was cramping. Shooting to temporals B with a headache like response. Pt states he was taking medication regularly but that eased and he is now using it intermittently.     Pain:  Current 7/10, worst 10/10, best 5/10   Location: neck and shoulders  Description: sore, ache, and stiffness  Aggravating Factors:staying still, sleeping, gardening  Easing Factors: massage, medication    Prior Therapy: yes  Social History: lives home with family  Occupation: mental health counselor  Prior Level of Function: gardening, shooting pool and shoot guns and lots of traveling; video brissa  Current Level of Function: limited in gardening and shooting due to pain; pain with video brissa    Imaging: none    Medical History:   Past Medical History:   Diagnosis Date    Essential hypertension 10/5/2020    Primary localized osteoarthritis of knees, bilateral 2/23/2022    Type 2 diabetes mellitus with microalbuminuria, without long-term current use of insulin 12/18/2021    Type 2 diabetes mellitus with other specified  complication 10/19/2020       Surgical History:   Josesito Crabtree  has a past surgical history that includes Knee surgery (Left, 1996/1997).    Medications:   Josesito has a current medication list which includes the following prescription(s): atorvastatin, blood sugar diagnostic, blood-glucose meter, hydrocodone-acetaminophen, lancets, meloxicam, metformin, methocarbamol, onetouch delica plus lancet, onetouch ultra2 meter, and valsartan-hydrochlorothiazide.    Allergies:   Review of patient's allergies indicates:  No Known Allergies     Pts goals: less pain with gardening and sleeping    Objective       CMS Impairment/Limitation/Restriction for FOTO neck Survey    Therapist reviewed FOTO scores for Josesito Crabtree on 10/23/2023.   FOTO documents entered into [email or web address] - see Media section.    Functional Score: 61       Headaches are daily to the temporal area B.    Posture: Pt noted to present with moderate forward head/rounded shoulder posture.  B scapula elevated and protracted, reduced thoracic kyphosis.     Shoulder ROM:    Active/Passive Joint Range Right Left   Flexion 180 180   ABDuction 180 180   External Rotation 80 80   Internal Rotation T 10 functionally B T 10 functionally B   Adduction 30 30   Extension 60 75     Pain with motions of B shoulder flexion and internal rotation .    Cervical Spine AROM:   Degrees Pain   Flexion 40    Extend 38 p   R side bend 35 p   L side bend 40 p   R rotation 50 p   L rotation 50 p     Strength:  Muscle (Myotome) Right Left   Cervical Deep neck Flexor hold time 16 sec    Shoulder Abduction 4 5   Elbow Flexors (C5) 5 5   Wrist Extensors (C6) 5 5   Elbow Extensors (C7) 5 5   ER (arm at side) 4 at 0 and 90 5   IR (arm at side) 4 5   Thumb extensors 5 5   Intrinsic strength 5 5     Sensation: Intact      Special Test:  Persaud -  Denisha  -     AC crossover -         Upper Limb Tension Test: -    Palpation:   Patient tender with increased tone over B UT R>L, levator scap, infraspinatus,  post cervical mm, SCM, and  med/lateral scapular muscles. .    TREATMENT   Treatment Time In: 10:00 am  Treatment Time Out: 10:30 am  Total Treatment time separate from Evaluation: 30 minutes    Josesito received the following manual therapy techniques: Manual traction and Soft tissue Mobilization were applied to the: neck and scapula for 15 minutes, including:  Suboccipital release  Soft tissue mobs to the infraspinatus R, upper trap R and levator and medial scapular border R    Josesito participated in neuromuscular re-education activities to improve: Balance, Proprioception, and Posture for 15 minutes. The following activities were included:    Supine chin tuck 2x 10  Supine retraction 1x 10  Supine chin tuck with retraction 2x 10 B  Supine scapular retract/protract 2x 15    To be added   Land mines, scapular retract/depress, shoulder external rotation, rows with scapular retraction, wall press up and Prone T  B      Home Exercises and Patient Education Provided    Education provided:   -Education on condition, HEP, and PT educated pt in deconditioning noted in shoulder muscles and scapular stabilizers as well as tension in the suboccipitals.    Written Home Exercises Provided: yes.  Exercises were reviewed and Josesito was able to demonstrate them prior to the end of the session.  Josesito demonstrated good  understanding of the education provided.     See EMR under Patient Instructions for exercises provided 10/23/2023.    Assessment   Josesito is a 43 y.o. male referred to outpatient Physical Therapy with a medical diagnosis of M47.22 (ICD-10-CM) - Osteoarthritis of spine with radiculopathy, cervical region, pt presents with signs and symptoms consistent with diagnosis along with neck ROM loss, neck pain. The patient presents with impairments which include impairments list: ROM, strength, endurance, joint mobility, muscle length, balance, posture, and functional movement patterns.  These impairments are limiting  patient's ability to reach, lift, turn the head and sleep well.     Pt prognosis is Good due to personal factors and co-morbidities listed below.   Pt will benefit from skilled outpatient Physical Therapy to address the deficits stated above and in the chart below, provide pt/family education, and to maximize pt's level of independence.     Plan of care discussed with patient: Yes  Pt's spiritual, cultural and educational needs considered and patient is agreeable to the plan of care and goals as stated below:     Anticipated Barriers for therapy: deconditioned,, sits often for home and work tasks, poor nutrition      Medical Necessity is demonstrated by the following  History  Co-morbidities and personal factors that may impact the plan of care [] LOW: no personal factors / co-morbidities  [x] MODERATE: 1-2 personal factors / co-morbidities  [] HIGH: 3+ personal factors / co-morbidities    Moderate / High Support Documentation:   Co-morbidities affecting plan of care: pre DMII, HTN, not sleeping well    Personal Factors:   lifestyle     Examination  Body Structures and Functions, activity limitations and participation restrictions that may impact the plan of care [x] LOW: addressing 1-2 elements  [] MODERATE: 3+ elements  [] HIGH: 4+ elements (please support below)    Moderate / High Support Documentation: na     Clinical Presentation [x] LOW: stable  [] MODERATE: Evolving  [] HIGH: Unstable     Decision Making/ Complexity Score: low         Goals:  Short Term Goals: In 4 weeks   1.Pt to be educated on HEP.  2.Patient to increase GH ROM T7 with R shoulder internal rotation    3.Patient to reduce headaches to 2x a week max  4.Patient to have pain less than 4/10 at all times.  5.Patient to improve score on the FOTO by 10%.  6. Pt to be educated on postural and body mechanics awareness.    Long Term Goals: In 10 weeks 1/1/23  1. Patient to improve score on the FOTO to 75.  2. Patient to demo increase in UE strength to  5/5 with external rotation  3. Patient to have decreased pain to 2/10 or better at all times.  4. Patient to demo increase cervical extension to 70 and rotation to 80 or better  5. Patient to perform daily activities including lifting up to 15 # overhead and dead lifting up to 60 # for gardening tasks.      Plan   Plan of care Certification: 10/23/2023 to 1/1/23.    Outpatient Physical Therapy 2 times weekly for 10 weeks to include the following interventions: Cervical/Lumbar Traction, Electrical Stimulation IFC, NMES, Gait Training, Manual Therapy, Moist Heat/ Ice, Neuromuscular Re-ed, Patient Education, Self Care, Therapeutic Activities, Therapeutic Exercise, and DN .     Diane Cheng, PT, CIDN, SFMA    Thank you for this referral.    These services are reasonable and necessary for the conditions set forth above while under my care.

## 2023-10-27 ENCOUNTER — CLINICAL SUPPORT (OUTPATIENT)
Dept: REHABILITATION | Facility: HOSPITAL | Age: 43
End: 2023-10-27
Payer: COMMERCIAL

## 2023-10-27 DIAGNOSIS — R29.898 DECREASED ROM OF NECK: Primary | ICD-10-CM

## 2023-10-27 DIAGNOSIS — M54.2 NECK PAIN: ICD-10-CM

## 2023-10-27 PROCEDURE — 97140 MANUAL THERAPY 1/> REGIONS: CPT | Mod: PN

## 2023-10-27 PROCEDURE — 97112 NEUROMUSCULAR REEDUCATION: CPT | Mod: PN

## 2023-10-27 PROCEDURE — 97110 THERAPEUTIC EXERCISES: CPT | Mod: PN

## 2023-10-27 NOTE — PROGRESS NOTES
Physical Therapy Daily Treatment Note     Name: Josesito Crabtree  Clinic Number: [id number]    Therapy Diagnosis:   Encounter Diagnoses   Name Primary?    Decreased ROM of neck Yes    Neck pain      Physician: Nancy Neil MD    Visit Date: 10/27/2023    Physician: Nancy Neil MD     Physician Orders: PT Eval and Treat   Medical Diagnosis from Referral: M47.22 (ICD-10-CM) - Osteoarthritis of spine with radiculopathy, cervical region  Evaluation Date: 10/23/2023  Authorization Period Expiration: 12/31/23  Plan of Care Expiration: 1/1/23  Visit # / Visits authorized: 2/20  Foto: 1/3     Precautions: Standard, HTN, DMII    Progress Note Due Date: due 30 days from 10/23/23    Time In: 10:44 am   Time Out: 11: 40 am  Total Time: 56 minutes    SUBJECTIVE     Today, pt reports: sore in the neck some on the lateral portions today but working on HEP well.  He was compliant with home exercise program.  Response to previous treatment: feeling less pain  Functional change: more flexible    Pre-Treatment Pain: 3/10  Post-Treatment Pain: 2/10  Location: neck pain  TREATMENT     Josesito received therapeutic exercises to develop strength, endurance, ROM, flexibility, and posture for 11 minutes including:    Seated backwards UBE 3 min (advance load and resistance and move to standing on follow up)  Seated rows 60 # with high rows 4x 10  Seated trunk rotation 24 # L to R 2x 15; R to L 1x 15    Josesito received the following manual therapy techniques: Joint mobilizations, Manual traction, Myofacial release, and Soft tissue Mobilization were applied to the: neck and shoulders for 30 minutes, including:    Suboccipital release,  manual cervical traction  Upper trap and suboccipital soft tissue mobilization , scapular medial border   Upper C mobs grade II-III for flexion     Josesito participated in neuromuscular re-education activities to improve: Balance, Kinesthetic, Proprioception, and Posture for 15 minutes. The following activities  were included:    Shoulder external rotation with red theraband  3x 10 cues on scapular retraction    Supine chin tuck 2x 10  Supine retraction 1x 10  Supine chin tuck with retraction 2x 10 B  Supine chin tuck with rotation and retraction 2x 10 B  Supine scapular retract/protract 2x 10 10 # per hand      To be added   Land mines, scapular retract/depress, shoulder external rotation, rows with scapular retraction, wall press up and Prone T  B      Josesito participated in dynamic functional therapeutic activities to improve functional performance for 0  minutes, including:          Josesito participated in gait training to improve functional mobility and safety for 0  minutes, including:      Josesito received the following direct contact modalities after being cleared for contraindications:     Josesito received the following supervised modalities after being cleared for contradictions:     Josesito received hot pack for 0 minutes to 0.    Josesito received cold pack for 0 minutes to 0.  Josesito received electrical stimulation for 0 minutes to 0      Home Exercises Provided and Patient Education Provided     Education/Self-Care provided: (during therex)  Patient educated on biomechanical justification for therapeutic exercise and importance of compliance with HEP in order to improve overall impairments and QOL   Patient educated on postural awareness and the use of a lumbar roll when in a seated position to reduce stress and maintain optimal alignment of the spine.   Patient educated on proper ergonomics at the work station in order to maintain optimal alignment of the musculoskeletal system and improve efficiency in the work environment.  Patient educated on the importance of improved core and upper and lower extremity strength in order to improve alignment of the spine and upper and lower extremities with static positions and dynamic movement.   Patient educated on the importance of strong core and lower extremity musculature  in order to improve both static and dynamic balance, improve gait mechanics, reduce fall risk and improve household and community mobility.       Written Home Exercises Provided: Patient instructed to cont prior HEP.  Exercises were reviewed and Josesito was able to demonstrate them prior to the end of the session.  Josesito demonstrated good  understanding of the education provided.     See EMR under Patient Instructions for exercises provided prior visit.    ASSESSMENT   Pt tolerated therex and manual therapy well. Pt is very tight in the upper traps and upper C spine as well as the lower cervical to T spine. PT worked on tasks to increase scapular mobility as well to release the neck and scapula more.   Pt tolerated exercise well with reports of increased fatigue but no increased pain. Pt demonstrated good understanding of exercises and required minimal cueing to maintain proper form.      Josesito Is progressing well towards his goals.   Pt prognosis is Good.     Pt will continue to benefit from skilled outpatient physical therapy to address the deficits listed in the problem list box on initial evaluation, provide pt/family education and to maximize pt's level of independence in the home and community environment.     Pt's spiritual, cultural and educational needs considered and pt agreeable to plan of care and goals.     Anticipated barriers to physical therapy: none    Goals:   Short Term Goals: In 4 weeks   1.Pt to be educated on HEP.  2.Patient to increase GH ROM T7 with R shoulder internal rotation    3.Patient to reduce headaches to 2x a week max  4.Patient to have pain less than 4/10 at all times.  5.Patient to improve score on the FOTO by 10%.  6. Pt to be educated on postural and body mechanics awareness.     Long Term Goals: In 10 weeks 1/1/23  1. Patient to improve score on the FOTO to 75.  2. Patient to demo increase in UE strength to 5/5 with external rotation  3. Patient to have decreased pain to 2/10  or better at all times.  4. Patient to demo increase cervical extension to 70 and rotation to 80 or better  5. Patient to perform daily activities including lifting up to 15 # overhead and dead lifting up to 60 # for gardening tasks.        Plan   Plan of care Certification: 10/23/2023 to 1/1/23.     Outpatient Physical Therapy 2 times weekly for 10 weeks to include the following interventions: Cervical/Lumbar Traction, Electrical Stimulation IFC, NMES, Gait Training, Manual Therapy, Moist Heat/ Ice, Neuromuscular Re-ed, Patient Education, Self Care, Therapeutic Activities, Therapeutic Exercise, and DN.      Continue Plan of Care (POC) and progress per patient tolerance.    Diane Cheng, PT, CIDN, SFMA

## 2023-10-30 ENCOUNTER — DOCUMENTATION ONLY (OUTPATIENT)
Dept: REHABILITATION | Facility: HOSPITAL | Age: 43
End: 2023-10-30

## 2023-10-30 ENCOUNTER — CLINICAL SUPPORT (OUTPATIENT)
Dept: REHABILITATION | Facility: HOSPITAL | Age: 43
End: 2023-10-30
Payer: COMMERCIAL

## 2023-10-30 DIAGNOSIS — M54.2 NECK PAIN: ICD-10-CM

## 2023-10-30 DIAGNOSIS — R29.898 DECREASED ROM OF NECK: Primary | ICD-10-CM

## 2023-10-30 PROCEDURE — 97112 NEUROMUSCULAR REEDUCATION: CPT | Mod: PN,CQ

## 2023-10-30 PROCEDURE — 97110 THERAPEUTIC EXERCISES: CPT | Mod: PN,CQ

## 2023-10-30 NOTE — PROGRESS NOTES
PT/PTA met face to face to discuss pt's treatment plan and progress towards established goals. Pt will be seen by a physical therapist minimally every 6th visit or every 30 days.    Trish Escoto PTA

## 2023-10-30 NOTE — PROGRESS NOTES
Physical Therapy Daily Treatment Note     Name: Josesito Crabtree  Clinic Number: [id number]    Therapy Diagnosis:   Encounter Diagnoses   Name Primary?    Decreased ROM of neck Yes    Neck pain      Physician: Nancy Neil MD    Visit Date: 10/30/2023    Physician: Nancy Neil MD     Physician Orders: PT Eval and Treat   Medical Diagnosis from Referral: M47.22 (ICD-10-CM) - Osteoarthritis of spine with radiculopathy, cervical region  Evaluation Date: 10/23/2023  Authorization Period Expiration: 12/31/23  Plan of Care Expiration: 1/1/23  Visit # / Visits authorized: 3/20  Foto: 1/3     Precautions: Standard, HTN, DMII    Progress Note Due Date: due 30 days from 10/23/23    Time In: 10:30 am   Time Out: 11:25 am  Total Time: 55 minutes    SUBJECTIVE     Today, pt reports: he feels great today. He slept better than he has in about two months. He didn't even take a pain pill this weekend. He was able to garden, cook and shoot his hand gun (not ready for rifle yet). He is going play paint ball in two weekends and he is hesitant about doing that.  He was compliant with home exercise program.  Response to previous treatment: feeling less pain  Functional change: more flexible    Pre-Treatment Pain: 0/10  Post-Treatment Pain: 0/10  Location: neck pain  TREATMENT     Josesito received therapeutic exercises to develop strength, endurance, ROM, flexibility, and posture for 10 minutes including:    Standing backwards UBE 6 min  Seated trunk rotation 24 # L to R 2x 15; R to L 1x 15 (very tight)    Josesito participated in neuromuscular re-education activities to improve: Balance, Kinesthetic, Proprioception, and Posture for 45 minutes. The following activities were included:    Seated matrix rows 65 # with high rows 4x 15  Supine chin tuck with retraction 2x 10   Supine chin tuck with rotation and retraction 2x 10 B  Supine scapular retract/protract 3x 10 10 # per hand   Prone T B 2x10  Prone Y (difficult) 2x8  Scapular  retract/depress 2x10  Shoulder external rotation with red theraband  3x 10 cues on scapular retraction    Med ex cervical extension machine seat at 353, 162# ROM 0-81 4 min with RPE of 3/10     To be added   Land mines, shoulder external rotation, wall press up      Josesito received the following manual therapy techniques: Joint mobilizations, Manual traction, Myofacial release, and Soft tissue Mobilization were applied to the: neck and shoulders for 0 minutes, including:    Suboccipital release,  manual cervical traction  Upper trap and suboccipital soft tissue mobilization , scapular medial border   Upper C mobs grade II-III for flexion     Josesito participated in dynamic functional therapeutic activities to improve functional performance for 0  minutes, including:    Josesito participated in gait training to improve functional mobility and safety for 0  minutes, including:      Josesito received the following direct contact modalities after being cleared for contraindications:     Josesito received the following supervised modalities after being cleared for contradictions:     Josesito received hot pack for 0 minutes to 0.    Josesito received cold pack for 0 minutes to 0.  Josesito received electrical stimulation for 0 minutes to 0      Home Exercises Provided and Patient Education Provided     Education/Self-Care provided: (during therex)  Patient educated on biomechanical justification for therapeutic exercise and importance of compliance with HEP in order to improve overall impairments and QOL   Patient educated on postural awareness and the use of a lumbar roll when in a seated position to reduce stress and maintain optimal alignment of the spine.   Patient educated on proper ergonomics at the work station in order to maintain optimal alignment of the musculoskeletal system and improve efficiency in the work environment.  Patient educated on the importance of improved core and upper and lower extremity strength in order  to improve alignment of the spine and upper and lower extremities with static positions and dynamic movement.   Patient educated on the importance of strong core and lower extremity musculature in order to improve both static and dynamic balance, improve gait mechanics, reduce fall risk and improve household and community mobility.       Written Home Exercises Provided: Patient instructed to cont prior HEP.  Exercises were reviewed and Josesito was able to demonstrate them prior to the end of the session.  Josesito demonstrated good  understanding of the education provided.     See EMR under Patient Instructions for exercises provided prior visit.    ASSESSMENT   Pt presents to therapy without any complaints of pain. Patient worked on postural strengthening exercises involving posterior chain strengthening. Cues provided with scapular retraction to decrease elevation. Patient has most difficulty with prone y that was added today with tactile cues for proper muscle recruitment. PT worked on cervical extension machine with patient to address weakness, range of motion and improve posture    Josesito Is progressing well towards his goals.   Pt prognosis is Good.     Pt will continue to benefit from skilled outpatient physical therapy to address the deficits listed in the problem list box on initial evaluation, provide pt/family education and to maximize pt's level of independence in the home and community environment.     Pt's spiritual, cultural and educational needs considered and pt agreeable to plan of care and goals.     Anticipated barriers to physical therapy: none    Goals:   Short Term Goals: In 4 weeks   1.Pt to be educated on HEP.  2.Patient to increase GH ROM T7 with R shoulder internal rotation    3.Patient to reduce headaches to 2x a week max  4.Patient to have pain less than 4/10 at all times.  5.Patient to improve score on the FOTO by 10%.  6. Pt to be educated on postural and body mechanics awareness.      Long Term Goals: In 10 weeks 1/1/23  1. Patient to improve score on the FOTO to 75.  2. Patient to demo increase in UE strength to 5/5 with external rotation  3. Patient to have decreased pain to 2/10 or better at all times.  4. Patient to demo increase cervical extension to 70 and rotation to 80 or better  5. Patient to perform daily activities including lifting up to 15 # overhead and dead lifting up to 60 # for gardening tasks.        Plan   Plan of care Certification: 10/23/2023 to 1/1/23.     Outpatient Physical Therapy 2 times weekly for 10 weeks to include the following interventions: Cervical/Lumbar Traction, Electrical Stimulation IFC, NMES, Gait Training, Manual Therapy, Moist Heat/ Ice, Neuromuscular Re-ed, Patient Education, Self Care, Therapeutic Activities, Therapeutic Exercise, and DN.      Continue Plan of Care (POC) and progress per patient tolerance.    Trish Escoto PTA,

## 2023-11-16 ENCOUNTER — TELEPHONE (OUTPATIENT)
Dept: REHABILITATION | Facility: HOSPITAL | Age: 43
End: 2023-11-16
Payer: COMMERCIAL

## 2023-11-16 NOTE — TELEPHONE ENCOUNTER
PT called to check on pt status as he was doing well on his last session with pain relief and has cancelled appt and may feel he is ready for d/c. PT will close chart if he wants or we can continue to advance if he feels the desire to continue. Our number provided.

## 2024-05-03 ENCOUNTER — PATIENT MESSAGE (OUTPATIENT)
Dept: ADMINISTRATIVE | Facility: HOSPITAL | Age: 44
End: 2024-05-03
Payer: COMMERCIAL

## 2024-05-29 ENCOUNTER — TELEPHONE (OUTPATIENT)
Dept: INTERNAL MEDICINE | Facility: CLINIC | Age: 44
End: 2024-05-29
Payer: COMMERCIAL

## 2024-08-02 ENCOUNTER — PATIENT MESSAGE (OUTPATIENT)
Dept: ADMINISTRATIVE | Facility: HOSPITAL | Age: 44
End: 2024-08-02
Payer: COMMERCIAL

## 2024-08-07 DIAGNOSIS — E11.9 TYPE 2 DIABETES MELLITUS, WITHOUT LONG-TERM CURRENT USE OF INSULIN: ICD-10-CM

## 2024-08-14 DIAGNOSIS — E11.9 TYPE 2 DIABETES MELLITUS WITHOUT COMPLICATION: ICD-10-CM

## 2025-02-04 ENCOUNTER — HOSPITAL ENCOUNTER (OUTPATIENT)
Dept: RADIOLOGY | Facility: HOSPITAL | Age: 45
Discharge: HOME | End: 2025-02-04
Attending: FAMILY MEDICINE
Payer: COMMERCIAL

## 2025-02-04 ENCOUNTER — OFFICE VISIT (OUTPATIENT)
Dept: INTERNAL MEDICINE | Facility: CLINIC | Age: 45
End: 2025-02-04
Payer: COMMERCIAL

## 2025-02-04 ENCOUNTER — TELEPHONE (OUTPATIENT)
Dept: INTERNAL MEDICINE | Facility: CLINIC | Age: 45
End: 2025-02-04

## 2025-02-04 ENCOUNTER — HOSPITAL ENCOUNTER (OUTPATIENT)
Dept: CARDIOLOGY | Facility: HOSPITAL | Age: 45
Discharge: HOME | End: 2025-02-04
Attending: FAMILY MEDICINE
Payer: COMMERCIAL

## 2025-02-04 VITALS
BODY MASS INDEX: 40.43 KG/M2 | SYSTOLIC BLOOD PRESSURE: 162 MMHG | WEIGHT: 315 LBS | RESPIRATION RATE: 18 BRPM | HEIGHT: 74 IN | DIASTOLIC BLOOD PRESSURE: 98 MMHG | HEART RATE: 61 BPM | OXYGEN SATURATION: 98 %

## 2025-02-04 DIAGNOSIS — E66.01 MORBID OBESITY WITH BMI OF 45.0-49.9, ADULT: Chronic | ICD-10-CM

## 2025-02-04 DIAGNOSIS — R07.89 ATYPICAL CHEST PAIN: ICD-10-CM

## 2025-02-04 DIAGNOSIS — Z29.9 PREVENTIVE MEASURE: ICD-10-CM

## 2025-02-04 DIAGNOSIS — B35.1 ONYCHOMYCOSIS OF MULTIPLE TOENAILS WITH TYPE 2 DIABETES MELLITUS: Chronic | ICD-10-CM

## 2025-02-04 DIAGNOSIS — E11.69 DYSLIPIDEMIA ASSOCIATED WITH TYPE 2 DIABETES MELLITUS: Chronic | ICD-10-CM

## 2025-02-04 DIAGNOSIS — E11.9 TYPE 2 DIABETES MELLITUS WITHOUT COMPLICATION, WITHOUT LONG-TERM CURRENT USE OF INSULIN: Chronic | ICD-10-CM

## 2025-02-04 DIAGNOSIS — E78.5 DYSLIPIDEMIA ASSOCIATED WITH TYPE 2 DIABETES MELLITUS: Chronic | ICD-10-CM

## 2025-02-04 DIAGNOSIS — E11.69 ONYCHOMYCOSIS OF MULTIPLE TOENAILS WITH TYPE 2 DIABETES MELLITUS: Chronic | ICD-10-CM

## 2025-02-04 DIAGNOSIS — F17.210 CIGARETTE NICOTINE DEPENDENCE, UNCOMPLICATED: ICD-10-CM

## 2025-02-04 DIAGNOSIS — E11.9 HYPERTENSION COMPLICATING DIABETES: Primary | ICD-10-CM

## 2025-02-04 DIAGNOSIS — G47.33 OBSTRUCTIVE SLEEP APNEA: Chronic | ICD-10-CM

## 2025-02-04 DIAGNOSIS — I10 HYPERTENSION COMPLICATING DIABETES: Primary | ICD-10-CM

## 2025-02-04 DIAGNOSIS — M17.0 PRIMARY LOCALIZED OSTEOARTHRITIS OF KNEES, BILATERAL: ICD-10-CM

## 2025-02-04 PROBLEM — L83 ACANTHOSIS NIGRICANS: Chronic | Status: RESOLVED | Noted: 2020-10-05 | Resolved: 2025-02-04

## 2025-02-04 PROBLEM — G89.29 BILATERAL CHRONIC KNEE PAIN: Status: RESOLVED | Noted: 2020-10-05 | Resolved: 2025-02-04

## 2025-02-04 PROBLEM — E11.65 TYPE 2 DIABETES MELLITUS WITH HYPERGLYCEMIA, WITHOUT LONG-TERM CURRENT USE OF INSULIN: Chronic | Status: RESOLVED | Noted: 2020-10-19 | Resolved: 2025-02-04

## 2025-02-04 PROBLEM — R80.9 TYPE 2 DIABETES MELLITUS WITH MICROALBUMINURIA, WITHOUT LONG-TERM CURRENT USE OF INSULIN: Chronic | Status: RESOLVED | Noted: 2021-12-18 | Resolved: 2025-02-04

## 2025-02-04 PROBLEM — M25.561 BILATERAL CHRONIC KNEE PAIN: Status: RESOLVED | Noted: 2020-10-05 | Resolved: 2025-02-04

## 2025-02-04 PROBLEM — M54.2 NECK PAIN: Status: RESOLVED | Noted: 2023-10-23 | Resolved: 2025-02-04

## 2025-02-04 PROBLEM — R29.898 DECREASED ROM OF NECK: Status: RESOLVED | Noted: 2023-10-23 | Resolved: 2025-02-04

## 2025-02-04 PROBLEM — E11.29 TYPE 2 DIABETES MELLITUS WITH MICROALBUMINURIA, WITHOUT LONG-TERM CURRENT USE OF INSULIN: Chronic | Status: RESOLVED | Noted: 2021-12-18 | Resolved: 2025-02-04

## 2025-02-04 PROBLEM — M25.562 BILATERAL CHRONIC KNEE PAIN: Status: RESOLVED | Noted: 2020-10-05 | Resolved: 2025-02-04

## 2025-02-04 LAB
OHS QRS DURATION: 102 MS
OHS QTC CALCULATION: 431 MS

## 2025-02-04 PROCEDURE — 3008F BODY MASS INDEX DOCD: CPT | Mod: CPTII,S$GLB,, | Performed by: FAMILY MEDICINE

## 2025-02-04 PROCEDURE — 71046 X-RAY EXAM CHEST 2 VIEWS: CPT | Mod: TC

## 2025-02-04 PROCEDURE — 99999 PR PBB SHADOW E&M-EST. PATIENT-LVL IV: CPT | Mod: PBBFAC,,, | Performed by: FAMILY MEDICINE

## 2025-02-04 PROCEDURE — 99214 OFFICE O/P EST MOD 30 MIN: CPT | Mod: S$GLB,,, | Performed by: FAMILY MEDICINE

## 2025-02-04 PROCEDURE — 1159F MED LIST DOCD IN RCRD: CPT | Mod: CPTII,S$GLB,, | Performed by: FAMILY MEDICINE

## 2025-02-04 PROCEDURE — 3077F SYST BP >= 140 MM HG: CPT | Mod: CPTII,S$GLB,, | Performed by: FAMILY MEDICINE

## 2025-02-04 PROCEDURE — 93005 ELECTROCARDIOGRAM TRACING: CPT

## 2025-02-04 PROCEDURE — 71046 X-RAY EXAM CHEST 2 VIEWS: CPT | Mod: 26,,, | Performed by: RADIOLOGY

## 2025-02-04 PROCEDURE — 93010 ELECTROCARDIOGRAM REPORT: CPT | Mod: ,,, | Performed by: INTERNAL MEDICINE

## 2025-02-04 PROCEDURE — 3080F DIAST BP >= 90 MM HG: CPT | Mod: CPTII,S$GLB,, | Performed by: FAMILY MEDICINE

## 2025-02-04 RX ORDER — VALSARTAN AND HYDROCHLOROTHIAZIDE 320; 25 MG/1; MG/1
1 TABLET, FILM COATED ORAL DAILY
Qty: 90 TABLET | Refills: 1 | Status: SHIPPED | OUTPATIENT
Start: 2025-02-04 | End: 2026-02-04

## 2025-02-04 RX ORDER — METFORMIN HYDROCHLORIDE 500 MG/1
1000 TABLET, EXTENDED RELEASE ORAL 2 TIMES DAILY WITH MEALS
Qty: 360 TABLET | Refills: 3 | Status: SHIPPED | OUTPATIENT
Start: 2025-02-04 | End: 2026-02-04

## 2025-02-04 NOTE — PROGRESS NOTES
Subjective:       Patient ID: Josesito Crabtree is a 44 y.o. male presents with   Patient Active Problem List   Diagnosis    Morbid obesity with BMI of 45.0-49.9, adult    Hypertension complicating diabetes    Cigarette nicotine dependence, uncomplicated    Obstructive sleep apnea (AHI = 21)    Chronic venous hypertension involving both sides    Dyslipidemia associated with type 2 diabetes mellitus    Ganglion cyst of volar aspect of left wrist    Primary localized osteoarthritis of knees, bilateral    Primary localized osteoarthritis of ankles, bilateral    Onychomycosis of multiple toenails with type 2 diabetes mellitus    Type 2 diabetes mellitus, without long-term current use of insulin    At risk for medication nonadherence        Chief Complaint: Annual Exam    History of Present Illness    Josesito presents today for physical exam but visit focus changed due to elevated blood pressure patient was not able to make an appointment with his primary care physician Dr. Monson.  He did not take blood pressure medication this morning but took it the previous night. Blood pressure is well-controlled when medication is taken as prescribed. He currently takes Valsartan/HCTZ 160/12.5. He experiences chest pain throughout the day while at work. He notes a knot on his left  side that is only noticeable when lying on the affected side. He denies pain from the knot but reports it is aggravating. He experiences stiffness and achiness with prolonged sitting, particularly on slow days, resulting in a limp when attempting to move. He has sleep apnea managed with consistent nightly CPAP use. He reports inconsistent adherence to metformin due to work schedule constraints and concerns about increased bathroom frequency. He attempts to take the medication on his days off work. He works as a  and smokes 5 cigarettes daily.      ROS:  General: -fever, -chills, -fatigue, -weight gain, -weight loss, -loss of  "appetite  Eyes: -vision changes, -blurry vision, -eye pain, -eye discharge  ENT: -ear pain, -hearing loss, -tinnitus, -nasal congestion, -sore throat  Cardiovascular: +chest pain, -palpitations, -lower extremity edema  Respiratory: -cough, -shortness of breath, -wheezing, -sputum production  Endocrine: -polyuria, -polydipsia, -heat intolerance, -cold intolerance  Gastrointestinal: -abdominal pain, -heartburn, -nausea, -vomiting, -diarrhea, -constipation, -blood in stool  Genitourinary: -dysuria, -urgency, -frequency, -hematuria, -nocturia, -incontinence  Heme & Lymphatic: -easy or excessive bleeding, -easy bruising, -swollen lymph nodes  Musculoskeletal: +muscle pain, -back pain, -joint pain, +joint swelling  Skin: -rash, +lesion, -itching, -skin texture changes, -skin color changes  Neurological: -headache, -dizziness, -numbness, -tingling, -seizure activity, -speech difficulty, -memory loss, -confusion  Psychiatric: -anxiety, -depression, -sleep difficulty                BP (!) 162/98 (BP Location: Right arm, Patient Position: Sitting)   Pulse 61   Resp 18   Ht 6' 2" (1.88 m)   Wt (!) 161.5 kg (356 lb 0.7 oz)   SpO2 98%   BMI 45.71 kg/m²   Objective:      Physical Exam  Constitutional:       Appearance: He is well-developed.   HENT:      Head: Normocephalic and atraumatic.   Cardiovascular:      Rate and Rhythm: Normal rate and regular rhythm.      Heart sounds: Normal heart sounds. No murmur heard.  Pulmonary:      Effort: Pulmonary effort is normal.      Breath sounds: Normal breath sounds. No wheezing.   Chest:      Chest wall: No tenderness.   Abdominal:      General: Bowel sounds are normal.      Palpations: Abdomen is soft.      Tenderness: There is no abdominal tenderness.   Skin:     General: Skin is warm and dry.      Findings: Lesion present. No rash.      Comments: Noted tiny skin lesion less than 0.5 cm x 0.5 cm to the left lateral ribcage   Neurological:      Mental Status: He is alert and " oriented to person, place, and time.   Psychiatric:         Mood and Affect: Mood normal.           Assessment/Plan:   1. Hypertension complicating diabetes  -     valsartan-hydrochlorothiazide (DIOVAN-HCT) 320-25 mg per tablet; Take 1 tablet by mouth once daily.  Dispense: 90 tablet; Refill: 1  -     Urinalysis, Reflex to Urine Culture Urine, Clean Catch; Future; Expected date: 02/04/2025  -     TSH; Future; Expected date: 02/04/2025  -     Lipid Panel; Future; Expected date: 02/04/2025  -     Comprehensive Metabolic Panel; Future; Expected date: 02/04/2025  Blood pressure elevated, will increase valsartan hydrochlorothiazide from 160/12.5 to 320/25 mg  Patient to follow-up in 2 weeks as a nurse visit for blood pressure check  If any worsening go to ER    2. Preventive measure  -     Urinalysis, Reflex to Urine Culture Urine, Clean Catch; Future; Expected date: 02/04/2025  -     Hemoglobin A1C; Future; Expected date: 02/04/2025  -     TSH; Future; Expected date: 02/04/2025  -     Lipid Panel; Future; Expected date: 02/04/2025  -     Comprehensive Metabolic Panel; Future; Expected date: 02/04/2025  -     CBC Auto Differential; Future; Expected date: 02/04/2025  -     Vitamin D; Future; Expected date: 02/04/2025  -     Vitamin B12; Future; Expected date: 02/04/2025  -     PSA, Screening; Future; Expected date: 02/04/2025  Will plan to get physicals done in next 2 weeks    3. Dyslipidemia associated with type 2 diabetes mellitus  -     Lipid Panel; Future; Expected date: 02/04/2025  Currently taking Lipitor 20 mg    4. Atypical chest pain  -     EKG 12-lead; Future  -     X-Ray Chest PA And Lateral; Future; Expected date: 02/04/2025  -     Cancel: TROPONIN I; Future; Expected date: 02/04/2025  -     CK; Future; Expected date: 02/04/2025  Secondary to atypical chest pain patient was advised to get further evaluation, if any worsening go to ER    5. Type 2 diabetes mellitus without complication, without long-term  current use of insulin  -     Hemoglobin A1C; Future; Expected date: 02/04/2025  -     Microalbumin/Creatinine Ratio, Urine; Future; Expected date: 02/04/2025  -     Vitamin B12; Future; Expected date: 02/04/2025  -     metFORMIN (GLUCOPHAGE-XR) 500 MG ER 24hr tablet; Take 2 tablets (1,000 mg total) by mouth 2 (two) times daily with meals.  Dispense: 360 tablet; Refill: 3  Patient was given importance about medication compliance and will change metformin from regular 1000 mg to  mg 2 tablets twice daily secondary to associated diarrhea  Has not been taking regularly his metformin due to GI side effects  Strict lifestyle changes recommended with 1800 ADA low-fat and low-cholesterol diet and exercise 30 minutes daily      6. Onychomycosis of multiple toenails with type 2 diabetes mellitus  Stable and asymptomatic    7. Obstructive sleep apnea (AHI = 21)  Overview:  11/10/2020 Home Sleep Test AHI = 21  Stable    8. Cigarette nicotine dependence, uncomplicated  Encouraged to quit smoking, currently smoking 5-6 cigarettes daily    9. Primary localized osteoarthritis of knees, bilateral  Graded exercise regimen recommended    10. Morbid obesity with BMI of 45.0-49.9, adult  Strict lifestyle changes recommended with diet and exercise to lose weight with BMI 45               This note was generated with the assistance of ambient listening technology. Verbal consent was obtained by the patient and accompanying visitor(s) for the recording of patient appointment to facilitate this note. I attest to having reviewed and edited the generated note for accuracy, though some syntax or spelling errors may persist. Please contact the author of this note for any clarification.

## 2025-02-04 NOTE — TELEPHONE ENCOUNTER
Spoke w/Anahy Franklin, clarified medication change./TF    ----- Message from Mc sent at 2/4/2025  9:39 AM CST -----  Contact: Rigoberto  [facility]  ..Type:  Pharmacy Calling to Clarify an RX    Name of Caller:Rigoberto  Pharmacy Name:[facility]  Prescription Name:metFORMIN (GLUCOPHAGE-XR) 500 MG ER 24hr tablet  What do they need to clarify?: Medication  Best Call Back Number:..[phone]  Additional Information: Rigoberto is wanting a call back in regard to clarifying if this medication for pt is correct.

## 2025-02-05 DIAGNOSIS — E55.9 VITAMIN D DEFICIENCY: Primary | ICD-10-CM

## 2025-02-05 RX ORDER — ERGOCALCIFEROL 1.25 MG/1
50000 CAPSULE ORAL
Qty: 12 CAPSULE | Refills: 1 | Status: SHIPPED | OUTPATIENT
Start: 2025-02-05

## 2025-02-18 ENCOUNTER — OFFICE VISIT (OUTPATIENT)
Dept: INTERNAL MEDICINE | Facility: CLINIC | Age: 45
End: 2025-02-18
Payer: COMMERCIAL

## 2025-02-18 VITALS
HEART RATE: 78 BPM | BODY MASS INDEX: 40.43 KG/M2 | OXYGEN SATURATION: 96 % | SYSTOLIC BLOOD PRESSURE: 138 MMHG | HEIGHT: 74 IN | WEIGHT: 315 LBS | DIASTOLIC BLOOD PRESSURE: 84 MMHG | RESPIRATION RATE: 18 BRPM

## 2025-02-18 DIAGNOSIS — F17.210 CIGARETTE NICOTINE DEPENDENCE, UNCOMPLICATED: ICD-10-CM

## 2025-02-18 DIAGNOSIS — E55.9 VITAMIN D DEFICIENCY: ICD-10-CM

## 2025-02-18 DIAGNOSIS — B35.1 ONYCHOMYCOSIS OF MULTIPLE TOENAILS WITH TYPE 2 DIABETES MELLITUS: Chronic | ICD-10-CM

## 2025-02-18 DIAGNOSIS — Z12.11 COLON CANCER SCREENING: ICD-10-CM

## 2025-02-18 DIAGNOSIS — E11.69 ONYCHOMYCOSIS OF MULTIPLE TOENAILS WITH TYPE 2 DIABETES MELLITUS: Chronic | ICD-10-CM

## 2025-02-18 DIAGNOSIS — G47.33 OBSTRUCTIVE SLEEP APNEA: Chronic | ICD-10-CM

## 2025-02-18 DIAGNOSIS — E11.9 TYPE 2 DIABETES MELLITUS WITHOUT COMPLICATION, WITHOUT LONG-TERM CURRENT USE OF INSULIN: Chronic | ICD-10-CM

## 2025-02-18 DIAGNOSIS — I10 HYPERTENSION COMPLICATING DIABETES: Chronic | ICD-10-CM

## 2025-02-18 DIAGNOSIS — E11.9 HYPERTENSION COMPLICATING DIABETES: Chronic | ICD-10-CM

## 2025-02-18 DIAGNOSIS — E66.01 MORBID OBESITY WITH BMI OF 40.0-44.9, ADULT: ICD-10-CM

## 2025-02-18 DIAGNOSIS — E78.5 DYSLIPIDEMIA ASSOCIATED WITH TYPE 2 DIABETES MELLITUS: Chronic | ICD-10-CM

## 2025-02-18 DIAGNOSIS — Z00.00 ROUTINE GENERAL MEDICAL EXAMINATION AT A HEALTH CARE FACILITY: Primary | ICD-10-CM

## 2025-02-18 DIAGNOSIS — E11.69 DYSLIPIDEMIA ASSOCIATED WITH TYPE 2 DIABETES MELLITUS: Chronic | ICD-10-CM

## 2025-02-18 DIAGNOSIS — M17.0 PRIMARY LOCALIZED OSTEOARTHRITIS OF KNEES, BILATERAL: ICD-10-CM

## 2025-02-18 PROBLEM — I87.303 CHRONIC VENOUS HYPERTENSION INVOLVING BOTH SIDES: Chronic | Status: RESOLVED | Noted: 2020-10-05 | Resolved: 2025-02-18

## 2025-02-18 PROBLEM — Z91.89 AT RISK FOR MEDICATION NONADHERENCE: Chronic | Status: RESOLVED | Noted: 2023-08-31 | Resolved: 2025-02-18

## 2025-02-18 PROBLEM — M67.432 GANGLION CYST OF VOLAR ASPECT OF LEFT WRIST: Status: RESOLVED | Noted: 2021-06-08 | Resolved: 2025-02-18

## 2025-02-18 RX ORDER — ROSUVASTATIN CALCIUM 20 MG/1
20 TABLET, COATED ORAL DAILY
Qty: 90 TABLET | Refills: 3 | Status: SHIPPED | OUTPATIENT
Start: 2025-02-18 | End: 2026-02-18

## 2025-02-18 NOTE — PROGRESS NOTES
Subjective:       Patient ID: Josesito Crabtree is a 44 y.o. male presents with Problem List[1]     Chief Complaint: Follow-up (2 Week)    History of Present Illness    Josesito presents today for routine annual physicals and follow up on blood pressure, to establish care previously seen by Dr. Monson. He reports right-sided chest pain and persistent right ankle swelling. He has a history of gout in knees and previous left knee reconstruction surgery. 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 He takes extended-release metformin for diabetes management, which has improved his previous diarrhea. His vitamin D levels were low on testing. He takes vitamin D supplements weekly, reporting increased energy and reduced sluggishness. He takes cholesterol medication regularly but requires a refill. Recent eye exam showed no evidence of diabetic retinopathy or other diabetes-related complications. He obtained new glasses through [employer] online services.  Patient denies of any chest tightness or difficulty breathing with palpitations, tingling or numbness sensation to extremities      ROS:  General: -fever, -chills, +fatigue, -weight gain, -weight loss, -loss of appetite  Eyes: -vision changes, -blurry vision, -eye pain, -eye discharge  ENT: -ear pain, -hearing loss, -tinnitus, -nasal congestion, -sore throat  Cardiovascular: +chest pain, -palpitations, -lower extremity edema  Respiratory: -cough, -shortness of breath, -wheezing, -sputum production  Endocrine: -polyuria, -polydipsia, -heat intolerance, -cold intolerance  Gastrointestinal: -abdominal pain, -heartburn, -nausea, -vomiting, -diarrhea, -constipation, -blood in stool  Genitourinary: -dysuria, -urgency, -frequency, -hematuria, -nocturia, -incontinence  Heme & Lymphatic: -easy or excessive bleeding, -easy bruising, -swollen lymph nodes  Musculoskeletal: -muscle pain, -back pain, -joint pain, -joint  "swelling  Skin: -rash, -lesion, -itching, -skin texture changes, -skin color changes  Neurological: -headache, -dizziness, -numbness, -tingling, -seizure activity, -speech difficulty, -memory loss, -confusion  Psychiatric: -anxiety, -depression, -sleep difficulty                /84 (BP Location: Right arm, Patient Position: Sitting)   Pulse 78   Resp 18   Ht 6' 2" (1.88 m)   Wt (!) 158.7 kg (349 lb 13.9 oz)   SpO2 96%   BMI 44.92 kg/m²   Objective:      Physical Exam  Constitutional:       Appearance: He is well-developed. He is obese.   HENT:      Head: Normocephalic and atraumatic.   Cardiovascular:      Rate and Rhythm: Normal rate and regular rhythm.      Heart sounds: Normal heart sounds. No murmur heard.  Pulmonary:      Effort: Pulmonary effort is normal.      Breath sounds: Normal breath sounds. No wheezing.   Chest:      Chest wall: No tenderness.   Abdominal:      General: Bowel sounds are normal.      Palpations: Abdomen is soft.      Tenderness: There is no abdominal tenderness.   Skin:     General: Skin is warm and dry.      Findings: No rash.   Neurological:      Mental Status: He is alert and oriented to person, place, and time.   Psychiatric:         Mood and Affect: Mood normal.         No visits with results within 2 Week(s) from this visit.   Latest known visit with results is:   Lab Visit on 02/04/2025   Component Date Value Ref Range Status    Hemoglobin A1C 02/04/2025 6.9 (H)  4.0 - 5.6 % Final    Comment: ADA Screening Guidelines:  5.7-6.4%  Consistent with prediabetes  >or=6.5%  Consistent with diabetes    High levels of fetal hemoglobin interfere with the HbA1C  assay. Heterozygous hemoglobin variants (HbS, HgC, etc)do  not significantly interfere with this assay.   However, presence of multiple variants may affect accuracy.      Estimated Avg Glucose 02/04/2025 151 (H)  68 - 131 mg/dL Final    TSH 02/04/2025 1.108  0.400 - 4.000 uIU/mL Final    Cholesterol 02/04/2025 163  120 - " 199 mg/dL Final    Comment: The National Cholesterol Education Program (NCEP) has set the  following guidelines (reference ranges) for Cholesterol:  Optimal.....................<200 mg/dL  Borderline High.............200-239 mg/dL  High........................> or = 240 mg/dL      Triglycerides 02/04/2025 63  30 - 150 mg/dL Final    Comment: The National Cholesterol Education Program (NCEP) has set the  following guidelines (reference values) for triglycerides:  Normal......................<150 mg/dL  Borderline High.............150-199 mg/dL  High........................200-499 mg/dL      HDL 02/04/2025 39 (L)  40 - 75 mg/dL Final    Comment: The National Cholesterol Education Program (NCEP) has set the  following guidelines (reference values) for HDL Cholesterol:  Low...............<40 mg/dL  Optimal...........>60 mg/dL      LDL Cholesterol 02/04/2025 111.4  63.0 - 159.0 mg/dL Final    Comment: The National Cholesterol Education Program (NCEP) has set the  following guidelines (reference values) for LDL Cholesterol:  Optimal.......................<130 mg/dL  Borderline High...............130-159 mg/dL  High..........................160-189 mg/dL  Very High.....................>190 mg/dL      HDL/Cholesterol Ratio 02/04/2025 23.9  20.0 - 50.0 % Final    Total Cholesterol/HDL Ratio 02/04/2025 4.2  2.0 - 5.0 Final    Non-HDL Cholesterol 02/04/2025 124  mg/dL Final    Comment: Risk category and Non-HDL cholesterol goals:  Coronary heart disease (CHD)or equivalent (10-year risk of CHD >20%):  Non-HDL cholesterol goal     <130 mg/dL  Two or more CHD risk factors and 10-year risk of CHD <= 20%:  Non-HDL cholesterol goal     <160 mg/dL  0 to 1 CHD risk factor:  Non-HDL cholesterol goal     <190 mg/dL      Sodium 02/04/2025 140  136 - 145 mmol/L Final    Potassium 02/04/2025 4.1  3.5 - 5.1 mmol/L Final    Chloride 02/04/2025 106  95 - 110 mmol/L Final    CO2 02/04/2025 22 (L)  23 - 29 mmol/L Final    Glucose 02/04/2025 112  (H)  70 - 110 mg/dL Final    BUN 02/04/2025 13  6 - 20 mg/dL Final    Creatinine 02/04/2025 1.0  0.5 - 1.4 mg/dL Final    Calcium 02/04/2025 9.3  8.7 - 10.5 mg/dL Final    Total Protein 02/04/2025 7.6  6.0 - 8.4 g/dL Final    Albumin 02/04/2025 3.9  3.5 - 5.2 g/dL Final    Total Bilirubin 02/04/2025 0.4  0.1 - 1.0 mg/dL Final    Comment: For infants and newborns, interpretation of results should be based  on gestational age, weight and in agreement with clinical  observations.    Premature Infant recommended reference ranges:  Up to 24 hours.............<8.0 mg/dL  Up to 48 hours............<12.0 mg/dL  3-5 days..................<15.0 mg/dL  6-29 days.................<15.0 mg/dL      Alkaline Phosphatase 02/04/2025 80  40 - 150 U/L Final    AST 02/04/2025 23  10 - 40 U/L Final    ALT 02/04/2025 29  10 - 44 U/L Final    eGFR 02/04/2025 >60.0  >60 mL/min/1.73 m^2 Final    Anion Gap 02/04/2025 12  8 - 16 mmol/L Final    WBC 02/04/2025 5.21  3.90 - 12.70 K/uL Final    RBC 02/04/2025 5.42  4.60 - 6.20 M/uL Final    Hemoglobin 02/04/2025 14.2  14.0 - 18.0 g/dL Final    Hematocrit 02/04/2025 46.8  40.0 - 54.0 % Final    MCV 02/04/2025 86  82 - 98 fL Final    MCH 02/04/2025 26.2 (L)  27.0 - 31.0 pg Final    MCHC 02/04/2025 30.3 (L)  32.0 - 36.0 g/dL Final    RDW 02/04/2025 13.2  11.5 - 14.5 % Final    Platelets 02/04/2025 274  150 - 450 K/uL Final    MPV 02/04/2025 10.9  9.2 - 12.9 fL Final    Immature Granulocytes 02/04/2025 0.4  0.0 - 0.5 % Final    Gran # (ANC) 02/04/2025 2.5  1.8 - 7.7 K/uL Final    Immature Grans (Abs) 02/04/2025 0.02  0.00 - 0.04 K/uL Final    Comment: Mild elevation in immature granulocytes is non specific and   can be seen in a variety of conditions including stress response,   acute inflammation, trauma and pregnancy. Correlation with other   laboratory and clinical findings is essential.      Lymph # 02/04/2025 2.1  1.0 - 4.8 K/uL Final    Mono # 02/04/2025 0.4  0.3 - 1.0 K/uL Final    Eos #  02/04/2025 0.1  0.0 - 0.5 K/uL Final    Baso # 02/04/2025 0.03  0.00 - 0.20 K/uL Final    nRBC 02/04/2025 0  0 /100 WBC Final    Gran % 02/04/2025 47.3  38.0 - 73.0 % Final    Lymph % 02/04/2025 40.9  18.0 - 48.0 % Final    Mono % 02/04/2025 8.3  4.0 - 15.0 % Final    Eosinophil % 02/04/2025 2.5  0.0 - 8.0 % Final    Basophil % 02/04/2025 0.6  0.0 - 1.9 % Final    Differential Method 02/04/2025 Automated   Final    Vit D, 25-Hydroxy 02/04/2025 16 (L)  30 - 96 ng/mL Final    Comment: Vitamin D deficiency.........<10 ng/mL                              Vitamin D insufficiency......10-29 ng/mL       Vitamin D sufficiency........> or equal to 30 ng/mL  Vitamin D toxicity............>100 ng/mL      Vitamin B-12 02/04/2025 375  210 - 950 pg/mL Final    PSA, Screen 02/04/2025 0.77  0.00 - 4.00 ng/mL Final    Comment: The testing method is a chemiluminescent microparticle immunoassay   manufactured by [employer] and performed on the    or   [employer] system. Values obtained with different assay manufacturers   for   methods may be different and cannot be used interchangeably.  PSA Expected levels:  Hormonal Therapy: <0.05 ng/ml  Prostatectomy: <0.01 ng/ml  Radiation Therapy: <1.00 ng/ml      CPK 02/04/2025 232 (H)  20 - 200 U/L Final    Troponin I High Sensitivity 02/04/2025 4  0 - 35 ng/L Final       Assessment/Plan:   1. Routine general medical examination at a health care facility  Vital signs stable today.  Clinical exam stable  Continue lifestyle modifications with low-fat and low-cholesterol diet and exercise 30 minutes daily  Encouraged to consider getting pneumonia vaccination    2. Hypertension complicating diabetes  Blood pressure is stable currently taking valsartan hydrochlorothiazide 320/25 mg, since increasing the dosage patient has been doing well    3. Dyslipidemia associated with type 2 diabetes mellitus  -     rosuvastatin (CRESTOR) 20 MG tablet; Take 1 tablet (20 mg total) by mouth once  daily.  Dispense: 90 tablet; Refill: 3  Reviewed recent labs showing LDL not under goal, will change Lipitor 20 mg to Crestor 20 mg  Avoid fried foods and exercise 30 minutes daily    4. Type 2 diabetes mellitus without complication, without long-term current use of insulin  -     Ambulatory referral/consult to Podiatry; Future; Expected date: 02/25/2025  Noted stable A1c and noted improvement with GI side effects since changing metformin to extended release  Continue current regimen  Patient up-to-date with diabetic eye exam at [facility] services  Will refer to Podiatry for diabetic foot exam and for ongoing ankle issues  Strict lifestyle changes recommended with 1800 ADA low-fat and low-cholesterol diet and exercise 30 minutes daily    5. Onychomycosis of multiple toenails with type 2 diabetes mellitus    6. Cigarette nicotine dependence, uncomplicated  Encouraged to quit smoking    7. Obstructive sleep apnea (AHI = 21)  Overview:  11/10/2020 Home Sleep Test AHI = 21  Advised to use CPAP machine regularly    8. Primary localized osteoarthritis of knees, bilateral  Graded exercise regimen recommended    9. Morbid obesity with BMI of 40.0-44.9, adult  Strict lifestyle changes recommended with diet and exercise to lose weight with BMI 44    10. Vitamin D deficiency  Patient was encouraged to take vitamin-D by prescription weekly    11. Colon cancer screening  -     Ambulatory referral/consult to Endo Procedure ; Future; Expected date: 02/19/2025  Due for colonoscopy         This note was generated with the assistance of ambient listening technology. Verbal consent was obtained by the patient and accompanying visitor(s) for the recording of patient appointment to facilitate this note. I attest to having reviewed and edited the generated note for accuracy, though some syntax or spelling errors may persist. Please contact the author of this note for any clarification.            [1]   Patient Active Problem  List  Diagnosis    Morbid obesity with BMI of 40.0-44.9, adult    Hypertension complicating diabetes    Cigarette nicotine dependence, uncomplicated    Obstructive sleep apnea (AHI = 21)    Dyslipidemia associated with type 2 diabetes mellitus    Primary localized osteoarthritis of knees, bilateral    Primary localized osteoarthritis of ankles, bilateral    Onychomycosis of multiple toenails with type 2 diabetes mellitus    Type 2 diabetes mellitus, without long-term current use of insulin

## 2025-03-06 ENCOUNTER — HOSPITAL ENCOUNTER (OUTPATIENT)
Dept: PREADMISSION TESTING | Facility: HOSPITAL | Age: 45
Discharge: HOME | End: 2025-03-06
Attending: COLON & RECTAL SURGERY
Payer: COMMERCIAL

## 2025-03-06 DIAGNOSIS — Z12.11 COLON CANCER SCREENING: ICD-10-CM

## 2025-03-10 ENCOUNTER — HOSPITAL ENCOUNTER (OUTPATIENT)
Dept: PREADMISSION TESTING | Facility: HOSPITAL | Age: 45
Discharge: HOME | End: 2025-03-10
Attending: INTERNAL MEDICINE
Payer: COMMERCIAL

## 2025-03-10 DIAGNOSIS — Z12.11 COLON CANCER SCREENING: Primary | ICD-10-CM

## 2025-03-10 RX ORDER — SODIUM, POTASSIUM,MAG SULFATES 17.5-3.13G
1 SOLUTION, RECONSTITUTED, ORAL ORAL DAILY
Qty: 1 KIT | Refills: 0 | Status: SHIPPED | OUTPATIENT
Start: 2025-03-10 | End: 2025-03-12

## 2025-03-13 ENCOUNTER — ANESTHESIA EVENT (OUTPATIENT)
Dept: ENDOSCOPY | Facility: HOSPITAL | Age: 45
End: 2025-03-13
Payer: COMMERCIAL

## 2025-03-13 NOTE — ANESTHESIA PREPROCEDURE EVALUATION
03/13/2025  Josesito Crabtree is a 44 y.o., male.  Past Medical History:   Diagnosis Date    Essential hypertension 10/5/2020    Primary localized osteoarthritis of knees, bilateral 2/23/2022    Type 2 diabetes mellitus with microalbuminuria, without long-term current use of insulin 12/18/2021    Type 2 diabetes mellitus with other specified complication 10/19/2020     Past Surgical History:   Procedure Laterality Date    KNEE SURGERY Left 1996/1997         Pre-op Assessment    I have reviewed the Patient Summary Reports.     I have reviewed the Nursing Notes. I have reviewed the NPO Status.   I have reviewed the Medications.     Review of Systems  Anesthesia Hx:  No problems with previous Anesthesia   History of prior surgery of interest to airway management or planning:  Previous anesthesia: General        Denies Family Hx of Anesthesia complications.    Denies Personal Hx of Anesthesia complications.                    Social:  Smoker, Social Alcohol Use       Hematology/Oncology:  Hematology Normal                                     Cardiovascular:     Hypertension                                          Pulmonary:        Sleep Apnea           Education provided regarding risk of obstructive sleep apnea            Renal/:  Renal/ Normal                 Hepatic/GI:  Hepatic/GI Normal Bowel Prep.                   Musculoskeletal:  Arthritis               Neurological:  Neurology Normal                                      Endocrine:  Diabetes, well controlled, type 2         Morbid Obesity / BMI > 40  Psych:  Psychiatric Normal                    Physical Exam  General: Alert and Oriented    Airway:  Mallampati: II   Mouth Opening: Normal  TM Distance: Normal  Tongue: Normal  Neck ROM: Normal ROM  Neck: Girth Increased    Dental:  Intact    Chest/Lungs:  Clear to auscultation, Normal Respiratory  Rate    Heart:  Rate: Normal  Rhythm: Regular Rhythm        Anesthesia Plan  Type of Anesthesia, risks & benefits discussed:    Anesthesia Type: Gen Natural Airway  Intra-op Monitoring Plan: Standard ASA Monitors  Post Op Pain Control Plan: multimodal analgesia  Induction:  IV  Informed Consent: Informed consent signed with the Patient and all parties understand the risks and agree with anesthesia plan.  All questions answered. Patient consented to blood products? No  ASA Score: 3  Day of Surgery Review of History & Physical: H&P Update referred to the surgeon/provider.    Ready For Surgery From Anesthesia Perspective.     .

## 2025-03-17 ENCOUNTER — HOSPITAL ENCOUNTER (OUTPATIENT)
Dept: ENDOSCOPY | Facility: HOSPITAL | Age: 45
Discharge: HOME | End: 2025-03-17
Attending: FAMILY MEDICINE
Payer: COMMERCIAL

## 2025-03-17 ENCOUNTER — ANESTHESIA (OUTPATIENT)
Dept: ENDOSCOPY | Facility: HOSPITAL | Age: 45
End: 2025-03-17
Payer: COMMERCIAL

## 2025-03-17 DIAGNOSIS — Z12.11 COLON CANCER SCREENING: ICD-10-CM

## 2025-03-17 LAB — POCT GLUCOSE: 108 MG/DL (ref 70–110)

## 2025-03-17 PROCEDURE — 63600175 PHARM REV CODE 636 W HCPCS: Performed by: NURSE ANESTHETIST, CERTIFIED REGISTERED

## 2025-03-17 PROCEDURE — 45385 COLONOSCOPY W/LESION REMOVAL: CPT | Mod: 33,,, | Performed by: INTERNAL MEDICINE

## 2025-03-17 PROCEDURE — 88305 TISSUE EXAM BY PATHOLOGIST: CPT | Performed by: PATHOLOGY

## 2025-03-17 PROCEDURE — 45385 COLONOSCOPY W/LESION REMOVAL: CPT | Mod: 33 | Performed by: INTERNAL MEDICINE

## 2025-03-17 PROCEDURE — 37000008 HC ANESTHESIA 1ST 15 MINUTES

## 2025-03-17 PROCEDURE — 25000003 PHARM REV CODE 250: Performed by: INTERNAL MEDICINE

## 2025-03-17 PROCEDURE — 37000009 HC ANESTHESIA EA ADD 15 MINS

## 2025-03-17 PROCEDURE — 82962 GLUCOSE BLOOD TEST: CPT

## 2025-03-17 PROCEDURE — 27201089 HC SNARE, DISP (ANY)

## 2025-03-17 RX ORDER — DEXTROMETHORPHAN/PSEUDOEPHED 2.5-7.5/.8
DROPS ORAL
Status: COMPLETED | OUTPATIENT
Start: 2025-03-17 | End: 2025-03-17

## 2025-03-17 RX ORDER — SODIUM CHLORIDE, SODIUM LACTATE, POTASSIUM CHLORIDE, CALCIUM CHLORIDE 600; 310; 30; 20 MG/100ML; MG/100ML; MG/100ML; MG/100ML
INJECTION, SOLUTION INTRAVENOUS CONTINUOUS PRN
Status: DISCONTINUED | OUTPATIENT
Start: 2025-03-17 | End: 2025-03-17

## 2025-03-17 RX ORDER — PROPOFOL 10 MG/ML
VIAL (ML) INTRAVENOUS
Status: DISCONTINUED | OUTPATIENT
Start: 2025-03-17 | End: 2025-03-17

## 2025-03-17 RX ORDER — LIDOCAINE HYDROCHLORIDE 20 MG/ML
INJECTION INTRAVENOUS
Status: DISCONTINUED | OUTPATIENT
Start: 2025-03-17 | End: 2025-03-17

## 2025-03-17 RX ADMIN — PROPOFOL 25 MG: 10 INJECTION, EMULSION INTRAVENOUS at 01:03

## 2025-03-17 RX ADMIN — LIDOCAINE HYDROCHLORIDE 60 MG: 20 INJECTION INTRAVENOUS at 01:03

## 2025-03-17 RX ADMIN — SIMETHICONE 200 MG: 20 SUSPENSION/ DROPS ORAL at 01:03

## 2025-03-17 RX ADMIN — PROPOFOL 50 MG: 10 INJECTION, EMULSION INTRAVENOUS at 01:03

## 2025-03-17 RX ADMIN — PROPOFOL 75 MG: 10 INJECTION, EMULSION INTRAVENOUS at 01:03

## 2025-03-17 RX ADMIN — SODIUM CHLORIDE, SODIUM LACTATE, POTASSIUM CHLORIDE, AND CALCIUM CHLORIDE: .6; .31; .03; .02 INJECTION, SOLUTION INTRAVENOUS at 01:03

## 2025-03-17 NOTE — ANESTHESIA POSTPROCEDURE EVALUATION
Anesthesia Post Evaluation    Patient: Josesito Crabtree    Procedure(s) Performed: * No procedures listed *    Final Anesthesia Type: general      Patient location during evaluation: PACU  Patient participation: Yes- Able to Participate  Level of consciousness: awake and alert and oriented  Post-procedure vital signs: reviewed and stable  Pain management: adequate  Airway patency: patent    PONV status at discharge: No PONV  Anesthetic complications: no      Cardiovascular status: blood pressure returned to baseline, stable and hemodynamically stable  Respiratory status: unassisted  Hydration status: euvolemic  Follow-up not needed.              Vitals Value Taken Time   /72 03/17/25 13:52   Temp 36.7 °C (98 °F) 03/17/25 13:22   Pulse 82 03/17/25 13:52   Resp 20 03/17/25 13:52   SpO2 97 % 03/17/25 13:52         Event Time   Out of Recovery 13:56:12         Pain/Ashtyn Score: Ashtyn Score: 10 (3/17/2025  1:52 PM)

## 2025-03-17 NOTE — TRANSFER OF CARE
"Anesthesia Transfer of Care Note    Patient: Josesito Crabtree    Procedure(s) Performed: * No procedures listed *    Patient location: PACU    Anesthesia Type: general    Transport from OR: Transported from OR on room air with adequate spontaneous ventilation    Post pain: adequate analgesia    Post assessment: no apparent anesthetic complications    Post vital signs: stable    Level of consciousness: awake    Nausea/Vomiting: no nausea/vomiting    Complications: none    Transfer of care protocol was followed      Last vitals: Visit Vitals  BP (!) 150/91 (BP Location: Right arm, Patient Position: Sitting)   Pulse 85   Temp 36.4 °C (97.5 °F) (Temporal)   Resp 18   Ht 6' 2" (1.88 m)   Wt (!) 152 kg (335 lb 1.6 oz)   SpO2 96%   BMI 43.02 kg/m²     "

## 2025-03-18 VITALS
SYSTOLIC BLOOD PRESSURE: 117 MMHG | TEMPERATURE: 98 F | WEIGHT: 315 LBS | BODY MASS INDEX: 40.43 KG/M2 | DIASTOLIC BLOOD PRESSURE: 72 MMHG | HEART RATE: 82 BPM | OXYGEN SATURATION: 97 % | HEIGHT: 74 IN | RESPIRATION RATE: 20 BRPM

## 2025-03-19 ENCOUNTER — RESULTS FOLLOW-UP (OUTPATIENT)
Dept: INTERNAL MEDICINE | Facility: CLINIC | Age: 45
End: 2025-03-19

## 2025-03-20 LAB
FINAL PATHOLOGIC DIAGNOSIS: NORMAL
GROSS: NORMAL
Lab: NORMAL

## 2025-07-31 DIAGNOSIS — E55.9 VITAMIN D DEFICIENCY: ICD-10-CM

## 2025-07-31 RX ORDER — ERGOCALCIFEROL 1.25 MG/1
50000 CAPSULE ORAL
Qty: 12 CAPSULE | Refills: 0 | Status: SHIPPED | OUTPATIENT
Start: 2025-07-31

## 2025-07-31 NOTE — TELEPHONE ENCOUNTER
Refill Routing Note   Medication(s) are not appropriate for processing by [facility] for the following reason(s):        Outside of protocol    [facility] action(s):  Route             Appointments  past 12m or future 3m with PCP    Date Provider   Last Visit   2/18/2025 Nancy Neil MD   Next Visit   Visit date not found Nancy Neil MD   ED visits in past 90 days: 0        Note composed:9:21 AM 07/31/2025

## 2025-08-19 ENCOUNTER — PATIENT MESSAGE (OUTPATIENT)
Dept: ADMINISTRATIVE | Facility: HOSPITAL | Age: 45
End: 2025-08-19
Payer: COMMERCIAL

## 2025-08-24 DIAGNOSIS — E11.9 HYPERTENSION COMPLICATING DIABETES: ICD-10-CM

## 2025-08-24 DIAGNOSIS — I10 HYPERTENSION COMPLICATING DIABETES: ICD-10-CM

## 2025-08-25 RX ORDER — VALSARTAN AND HYDROCHLOROTHIAZIDE 320; 25 MG/1; MG/1
1 TABLET, FILM COATED ORAL DAILY
Qty: 90 TABLET | Refills: 1 | Status: SHIPPED | OUTPATIENT
Start: 2025-08-25